# Patient Record
Sex: MALE | Race: WHITE | NOT HISPANIC OR LATINO | Employment: OTHER | URBAN - METROPOLITAN AREA
[De-identification: names, ages, dates, MRNs, and addresses within clinical notes are randomized per-mention and may not be internally consistent; named-entity substitution may affect disease eponyms.]

---

## 2024-08-16 ENCOUNTER — HOSPITAL ENCOUNTER (OUTPATIENT)
Facility: HOSPITAL | Age: 64
Discharge: HOME | End: 2024-08-18
Attending: EMERGENCY MEDICINE | Admitting: EMERGENCY MEDICINE
Payer: COMMERCIAL

## 2024-08-16 ENCOUNTER — APPOINTMENT (OUTPATIENT)
Dept: CT IMAGING | Facility: HOSPITAL | Age: 64
End: 2024-08-16
Payer: COMMERCIAL

## 2024-08-16 DIAGNOSIS — J38.3 VOCAL CORD DYSFUNCTION: ICD-10-CM

## 2024-08-16 DIAGNOSIS — K35.30 ACUTE APPENDICITIS WITH LOCALIZED PERITONITIS, WITHOUT PERFORATION, ABSCESS, OR GANGRENE: Primary | ICD-10-CM

## 2024-08-16 DIAGNOSIS — K37 APPENDICITIS: ICD-10-CM

## 2024-08-16 PROBLEM — K35.80 ACUTE APPENDICITIS: Status: ACTIVE | Noted: 2024-08-16

## 2024-08-16 LAB
ALBUMIN SERPL-MCNC: 4.1 G/DL (ref 3.5–5.2)
ALBUMIN/GLOB SERPL: 1.2 G/DL
ALP SERPL-CCNC: 82 U/L (ref 39–117)
ALT SERPL W P-5'-P-CCNC: 15 U/L (ref 1–41)
ANION GAP SERPL CALCULATED.3IONS-SCNC: 10 MMOL/L (ref 5–15)
AST SERPL-CCNC: 14 U/L (ref 1–40)
BACTERIA UR QL AUTO: ABNORMAL /HPF
BASOPHILS # BLD AUTO: 0.08 10*3/MM3 (ref 0–0.2)
BASOPHILS NFR BLD AUTO: 0.6 % (ref 0–1.5)
BILIRUB SERPL-MCNC: 0.5 MG/DL (ref 0–1.2)
BILIRUB UR QL STRIP: NEGATIVE
BUN SERPL-MCNC: 16 MG/DL (ref 8–23)
BUN/CREAT SERPL: 14.5 (ref 7–25)
CALCIUM SPEC-SCNC: 9.8 MG/DL (ref 8.6–10.5)
CHLORIDE SERPL-SCNC: 98 MMOL/L (ref 98–107)
CLARITY UR: CLEAR
CO2 SERPL-SCNC: 28 MMOL/L (ref 22–29)
COLOR UR: ABNORMAL
CREAT SERPL-MCNC: 1.1 MG/DL (ref 0.76–1.27)
D-LACTATE SERPL-SCNC: 1.1 MMOL/L (ref 0.5–2)
DEPRECATED RDW RBC AUTO: 43.9 FL (ref 37–54)
EGFRCR SERPLBLD CKD-EPI 2021: 75 ML/MIN/1.73
EOSINOPHIL # BLD AUTO: 0.2 10*3/MM3 (ref 0–0.4)
EOSINOPHIL NFR BLD AUTO: 1.5 % (ref 0.3–6.2)
ERYTHROCYTE [DISTWIDTH] IN BLOOD BY AUTOMATED COUNT: 12.6 % (ref 12.3–15.4)
GLOBULIN UR ELPH-MCNC: 3.4 GM/DL
GLUCOSE SERPL-MCNC: 127 MG/DL (ref 65–99)
GLUCOSE UR STRIP-MCNC: NEGATIVE MG/DL
HCT VFR BLD AUTO: 44.6 % (ref 37.5–51)
HGB BLD-MCNC: 15.4 G/DL (ref 13–17.7)
HGB UR QL STRIP.AUTO: NEGATIVE
HOLD SPECIMEN: NORMAL
HYALINE CASTS UR QL AUTO: ABNORMAL /LPF
IMM GRANULOCYTES # BLD AUTO: 0.05 10*3/MM3 (ref 0–0.05)
IMM GRANULOCYTES NFR BLD AUTO: 0.4 % (ref 0–0.5)
KETONES UR QL STRIP: ABNORMAL
LEUKOCYTE ESTERASE UR QL STRIP.AUTO: ABNORMAL
LIPASE SERPL-CCNC: 36 U/L (ref 13–60)
LYMPHOCYTES # BLD AUTO: 3.43 10*3/MM3 (ref 0.7–3.1)
LYMPHOCYTES NFR BLD AUTO: 25.7 % (ref 19.6–45.3)
MCH RBC QN AUTO: 33 PG (ref 26.6–33)
MCHC RBC AUTO-ENTMCNC: 34.5 G/DL (ref 31.5–35.7)
MCV RBC AUTO: 95.5 FL (ref 79–97)
MONOCYTES # BLD AUTO: 1.43 10*3/MM3 (ref 0.1–0.9)
MONOCYTES NFR BLD AUTO: 10.7 % (ref 5–12)
MUCOUS THREADS URNS QL MICRO: ABNORMAL /HPF
NEUTROPHILS NFR BLD AUTO: 61.1 % (ref 42.7–76)
NEUTROPHILS NFR BLD AUTO: 8.14 10*3/MM3 (ref 1.7–7)
NITRITE UR QL STRIP: NEGATIVE
NRBC BLD AUTO-RTO: 0 /100 WBC (ref 0–0.2)
PH UR STRIP.AUTO: 5.5 [PH] (ref 5–8)
PLATELET # BLD AUTO: 245 10*3/MM3 (ref 140–450)
PMV BLD AUTO: 9.4 FL (ref 6–12)
POTASSIUM SERPL-SCNC: 4 MMOL/L (ref 3.5–5.2)
PROT SERPL-MCNC: 7.5 G/DL (ref 6–8.5)
PROT UR QL STRIP: ABNORMAL
RBC # BLD AUTO: 4.67 10*6/MM3 (ref 4.14–5.8)
RBC # UR STRIP: ABNORMAL /HPF
REF LAB TEST METHOD: ABNORMAL
SODIUM SERPL-SCNC: 136 MMOL/L (ref 136–145)
SP GR UR STRIP: 1.03 (ref 1–1.03)
SQUAMOUS #/AREA URNS HPF: ABNORMAL /HPF
UROBILINOGEN UR QL STRIP: ABNORMAL
WBC # UR STRIP: ABNORMAL /HPF
WBC NRBC COR # BLD AUTO: 13.33 10*3/MM3 (ref 3.4–10.8)
WHOLE BLOOD HOLD COAG: NORMAL
WHOLE BLOOD HOLD SPECIMEN: NORMAL

## 2024-08-16 PROCEDURE — 25010000002 MORPHINE PER 10 MG: Performed by: EMERGENCY MEDICINE

## 2024-08-16 PROCEDURE — 81001 URINALYSIS AUTO W/SCOPE: CPT | Performed by: EMERGENCY MEDICINE

## 2024-08-16 PROCEDURE — 96365 THER/PROPH/DIAG IV INF INIT: CPT

## 2024-08-16 PROCEDURE — 85025 COMPLETE CBC W/AUTO DIFF WBC: CPT | Performed by: EMERGENCY MEDICINE

## 2024-08-16 PROCEDURE — 74177 CT ABD & PELVIS W/CONTRAST: CPT

## 2024-08-16 PROCEDURE — 36415 COLL VENOUS BLD VENIPUNCTURE: CPT

## 2024-08-16 PROCEDURE — 25010000002 PIPERACILLIN SOD-TAZOBACTAM PER 1 G: Performed by: PHYSICIAN ASSISTANT

## 2024-08-16 PROCEDURE — 96375 TX/PRO/DX INJ NEW DRUG ADDON: CPT

## 2024-08-16 PROCEDURE — 87040 BLOOD CULTURE FOR BACTERIA: CPT | Performed by: PHYSICIAN ASSISTANT

## 2024-08-16 PROCEDURE — 80053 COMPREHEN METABOLIC PANEL: CPT | Performed by: EMERGENCY MEDICINE

## 2024-08-16 PROCEDURE — 83690 ASSAY OF LIPASE: CPT | Performed by: EMERGENCY MEDICINE

## 2024-08-16 PROCEDURE — G0378 HOSPITAL OBSERVATION PER HR: HCPCS

## 2024-08-16 PROCEDURE — 25010000002 ONDANSETRON PER 1 MG: Performed by: EMERGENCY MEDICINE

## 2024-08-16 PROCEDURE — 25510000001 IOPAMIDOL 61 % SOLUTION: Performed by: EMERGENCY MEDICINE

## 2024-08-16 PROCEDURE — 99285 EMERGENCY DEPT VISIT HI MDM: CPT

## 2024-08-16 PROCEDURE — 83605 ASSAY OF LACTIC ACID: CPT | Performed by: EMERGENCY MEDICINE

## 2024-08-16 RX ORDER — SODIUM CHLORIDE 9 MG/ML
10 INJECTION, SOLUTION INTRAMUSCULAR; INTRAVENOUS; SUBCUTANEOUS AS NEEDED
Status: DISCONTINUED | OUTPATIENT
Start: 2024-08-16 | End: 2024-08-18 | Stop reason: HOSPADM

## 2024-08-16 RX ORDER — EZETIMIBE 10 MG/1
10 TABLET ORAL DAILY
COMMUNITY

## 2024-08-16 RX ORDER — SPIRONOLACTONE 25 MG/1
25 TABLET ORAL DAILY
COMMUNITY

## 2024-08-16 RX ORDER — MORPHINE SULFATE 4 MG/ML
4 INJECTION, SOLUTION INTRAMUSCULAR; INTRAVENOUS ONCE
Status: COMPLETED | OUTPATIENT
Start: 2024-08-16 | End: 2024-08-16

## 2024-08-16 RX ORDER — ONDANSETRON 2 MG/ML
4 INJECTION INTRAMUSCULAR; INTRAVENOUS ONCE
Status: COMPLETED | OUTPATIENT
Start: 2024-08-16 | End: 2024-08-16

## 2024-08-16 RX ADMIN — PIPERACILLIN AND TAZOBACTAM 3.38 G: 3; .375 INJECTION, POWDER, LYOPHILIZED, FOR SOLUTION INTRAVENOUS at 22:49

## 2024-08-16 RX ADMIN — MORPHINE SULFATE 4 MG: 4 INJECTION, SOLUTION INTRAMUSCULAR; INTRAVENOUS at 20:54

## 2024-08-16 RX ADMIN — ONDANSETRON 4 MG: 2 INJECTION INTRAMUSCULAR; INTRAVENOUS at 20:53

## 2024-08-16 RX ADMIN — IOPAMIDOL 85 ML: 612 INJECTION, SOLUTION INTRAVENOUS at 20:35

## 2024-08-16 NOTE — ED PROVIDER NOTES
Subjective  History of Present Illness:    Chief Complaint: Right lower quadrant abdominal pain  History of Present Illness: 64-year-old male with right lower quadrant abdominal pain, the pain began 3 days ago in his mid to lower abdomen, then migrated to the right side.  He said a decreased appetite.  The pain is sharp in nature, he was seen at urgent treatment center and sent to the emergency department today for further evaluation.  Significant past medical history for coronary disease, status post bypass, hypertension, hyperlipidemia.  The last time he ate was open of this afternoon at about noon.  No urinary symptoms, no fever no chills, no previous abdominal surgeries  Onset: Gradual  Duration: 2 to 3 days  Exacerbating / Alleviating factors: Pain is worse with movement and to touch  Associated symptoms: Constipation earlier in the week      Nurses Notes reviewed and agree, including vitals, allergies, social history and prior medical history.     REVIEW OF SYSTEMS: All systems reviewed and not pertinent unless noted.    Review of Systems   Constitutional:  Positive for appetite change.   Gastrointestinal:  Positive for abdominal pain and nausea.   All other systems reviewed and are negative.      Past Medical History:   Diagnosis Date    Heart disease     History of quadruple bypass     Hyperlipidemia     Hypertension     Myocardial infarction     Ulcerative colitis        Allergies:    Patient has no known allergies.      Past Surgical History:   Procedure Laterality Date    CORONARY ARTERY BYPASS GRAFT      LUNG BIOPSY           Social History     Socioeconomic History    Marital status:    Tobacco Use    Smoking status: Every Day     Types: Cigars    Tobacco comments:     1 cigar QD   Substance and Sexual Activity    Alcohol use: Yes     Comment: Moderate    Drug use: No    Sexual activity: Defer         Family History   Problem Relation Age of Onset    Heart disease Mother     Heart disease Father   "   Cancer Sister        Objective  Physical Exam:  /66 (BP Location: Right arm, Patient Position: Sitting)   Pulse 58   Temp 98.4 °F (36.9 °C) (Oral)   Resp 18   Ht 172.7 cm (68\")   Wt 80.7 kg (178 lb)   SpO2 94%   BMI 27.06 kg/m²      Physical Exam  Vitals and nursing note reviewed.   Constitutional:       Appearance: He is well-developed.   HENT:      Head: Normocephalic and atraumatic.      Mouth/Throat:      Mouth: Mucous membranes are moist.   Eyes:      Extraocular Movements: Extraocular movements intact.   Cardiovascular:      Rate and Rhythm: Normal rate and regular rhythm.   Pulmonary:      Effort: Pulmonary effort is normal.      Breath sounds: Normal breath sounds.   Abdominal:      Palpations: Abdomen is soft.      Tenderness: There is abdominal tenderness in the right lower quadrant. There is guarding.   Musculoskeletal:         General: Normal range of motion.      Cervical back: Normal range of motion.   Skin:     General: Skin is warm and dry.   Neurological:      Mental Status: He is alert and oriented to person, place, and time.   Psychiatric:         Behavior: Behavior normal.         Thought Content: Thought content normal.         Judgment: Judgment normal.           Procedures    ED Course:    CT abdomen/pelvis interpretation by me: Inflamed appendix with surrounding inflammation and stranding consistent with acute appendicitis    ED Course as of 08/16/24 2310   Fri Aug 16, 2024   1946 Review of previous  non ED visits, prior labs, prior imaging, available notes from prior evaluations or visits with specialists, medication list, allergies, past medical history, past surgical history     [CS]   2030 I Personally reviewed all laboratory studies performed in the emergency department  [CS]   2145 Call placed to the exchange for Dr. Mott  general surgery [CS]   2149 Discussed the case with Dr. Mott, general surgery, he recommended admitting to the hospitalist, given IV Zosyn, plan " for operation in the morning [CS]   2159 Updated the patient on the plan of admission, antibiotics, and plan for surgery in the morning answered all questions and concerns. [CS]   2200 Message sent to Dr. Ross for admission [CS]      ED Course User Index  [CS] Parveen Diaz Jr., KAYLEIGH       Lab Results (last 24 hours)       Procedure Component Value Units Date/Time    CBC & Differential [[id number]]  (Abnormal) Collected: 08/16/24 2006    Specimen: Blood Updated: 08/16/24 2016    Narrative:      The following orders were created for panel order CBC & Differential.  Procedure                               Abnormality         Status                     ---------                               -----------         ------                     CBC Auto Differential[[id number]]        Abnormal            Final result                 Please view results for these tests on the individual orders.    Comprehensive Metabolic Panel [[id number]]  (Abnormal) Collected: 08/16/24 2006    Specimen: Blood Updated: 08/16/24 2035     Glucose 127 mg/dL      BUN 16 mg/dL      Creatinine 1.10 mg/dL      Sodium 136 mmol/L      Potassium 4.0 mmol/L      Chloride 98 mmol/L      CO2 28.0 mmol/L      Calcium 9.8 mg/dL      Total Protein 7.5 g/dL      Albumin 4.1 g/dL      ALT (SGPT) 15 U/L      AST (SGOT) 14 U/L      Alkaline Phosphatase 82 U/L      Total Bilirubin 0.5 mg/dL      Globulin 3.4 gm/dL      Comment: Calculated Result        A/G Ratio 1.2 g/dL      BUN/Creatinine Ratio 14.5     Anion Gap 10.0 mmol/L      eGFR 75.0 mL/min/1.73     Narrative:      GFR Normal >60  Chronic Kidney Disease <60  Kidney Failure <15      Lipase [[id number]]  (Normal) Collected: 08/16/24 2006    Specimen: Blood Updated: 08/16/24 2035     Lipase 36 U/L     Lactic Acid, Plasma [[id number]]  (Normal) Collected: 08/16/24 2006    Specimen: Blood Updated: 08/16/24 2032     Lactate 1.1 mmol/L      Comment: Falsely depressed results may occur on samples drawn from  patients receiving N-Acetylcysteine (NAC) or Metamizole.       CBC Auto Differential [[id number]]  (Abnormal) Collected: 08/16/24 2006    Specimen: Blood Updated: 08/16/24 2016     WBC 13.33 10*3/mm3      RBC 4.67 10*6/mm3      Hemoglobin 15.4 g/dL      Hematocrit 44.6 %      MCV 95.5 fL      MCH 33.0 pg      MCHC 34.5 g/dL      RDW 12.6 %      RDW-SD 43.9 fl      MPV 9.4 fL      Platelets 245 10*3/mm3      Neutrophil % 61.1 %      Lymphocyte % 25.7 %      Monocyte % 10.7 %      Eosinophil % 1.5 %      Basophil % 0.6 %      Immature Grans % 0.4 %      Neutrophils, Absolute 8.14 10*3/mm3      Lymphocytes, Absolute 3.43 10*3/mm3      Monocytes, Absolute 1.43 10*3/mm3      Eosinophils, Absolute 0.20 10*3/mm3      Basophils, Absolute 0.08 10*3/mm3      Immature Grans, Absolute 0.05 10*3/mm3      nRBC 0.0 /100 WBC     Urinalysis With Microscopic If Indicated (No Culture) - Urine, Clean Catch [[id number]]  (Abnormal) Collected: 08/16/24 2008    Specimen: Urine, Clean Catch Updated: 08/16/24 2024     Color, UA Dark Yellow     Appearance, UA Clear     pH, UA 5.5     Specific Gravity, UA 1.026     Glucose, UA Negative     Ketones, UA Trace     Bilirubin, UA Negative     Blood, UA Negative     Protein, UA Trace     Leuk Esterase, UA Trace     Nitrite, UA Negative     Urobilinogen, UA 1.0 E.U./dL    Urinalysis, Microscopic Only - Urine, Clean Catch [[id number]]  (Abnormal) Collected: 08/16/24 2008    Specimen: Urine, Clean Catch Updated: 08/16/24 2032     RBC, UA 3-5 /HPF      WBC, UA 0-2 /HPF      Bacteria, UA None Seen /HPF      Squamous Epithelial Cells, UA 0-2 /HPF      Hyaline Casts, UA 0-6 /LPF      Mucus, UA Moderate/2+ /HPF      Methodology Manual Light Microscopy             CT Abdomen Pelvis With Contrast    Result Date: 8/16/2024  CT ABDOMEN PELVIS W CONTRAST Date of Exam: 8/16/2024 8:30 PM EDT Indication: rlq pain  r/o appendicitis. Comparison: None available. Technique: Axial CT images were obtained of the abdomen  and pelvis following the uneventful intravenous administration of iodinated contrast. Reconstructed coronal and sagittal images were also obtained. Automated exposure control and iterative construction methods were used. Findings: The lung bases are clear bilaterally. A gallstone is noted in the gallbladder without CT evidence of cholecystitis. The liver, spleen, pancreas and adrenal glands appear unremarkable. Both kidneys appear normal. Moderate atherosclerotic changes are noted in the abdominal aorta and its major branches. No adenopathy or free fluid is seen in the abdomen or pelvis. The urinary bladder, seminal vesicles and prostate appear within normal limits for age. The appendix is thickened measuring 1.2 cm in diameter. Surrounding infiltrative changes are noted consistent with acute appendicitis. Secondary inflammatory changes of the terminal ileum are suspected. No free air or abscess is identified. The bowel otherwise appears unremarkable. There is a 0.2 cm anterolisthesis of L4 on L5. No focal osseous lesion is seen.     Impression: Impression: 1.Acute appendicitis without evidence of perforation or abscess. 2.Suspected secondary inflammation of the adjacent terminal ileum. 3.Cholelithiasis. Electronically Signed: Shine Lua MD  8/16/2024 9:35 PM EDT  Workstation ID: [id number]        Medical Decision Making  Patient Presentation 64-year-old male presented with right-sided abdominal pain, on minus assessment he had tenderness to palpation in his right lower quadrant with guarding    DDX. Differential diagnosis would include early appendicitis, acute appendicitis, colitis, diverticulitis, irritable bowel disease, irritable syndrome, nephrolithiasis, pyelonephritis.       Data Review/ Non ED Records /Analysis/Ordering unique tests  Review of previous  non ED visits, prior labs, prior imaging, available notes from prior evaluations or visits with specialists, medication list, allergies, past medical  history, past surgical history        Independent Review Studies  I Personally reviewed all laboratory studies performed in the emergency department     Intervention/Re-evaluation intervention included IV fluids, antiemetics and pain medicine on reevaluation his symptoms were improved    Independent Clinician discussed with the on-call general surgeon Dr. Mott, discussed with the on-call hospitalist Dr. Ross who accepted for admission    Risk Stratification tools/clinical decision rules patient presented with right lower quadrant abdominal pain, considered appendicitis, colitis, enteritis, urinary tract infection, kidney stones, on my initial assessment he had tenderness to palpation in his right lower quadrant with guarding, labs show an elevation in his white count with a shift, CT scan was consistent with acute appendicitis    Shared Decision Making discussed results with patient and plan for surgery    Disposition patient admitted for appendectomy    Problems Addressed:  Acute appendicitis with localized peritonitis, without perforation, abscess, or gangrene: complicated acute illness or injury    Amount and/or Complexity of Data Reviewed  External Data Reviewed: labs and notes.  Labs: ordered. Decision-making details documented in ED Course.  Radiology: ordered and independent interpretation performed. Decision-making details documented in ED Course.    Risk  Prescription drug management.  Decision regarding hospitalization.          Final diagnoses:   Acute appendicitis with localized peritonitis, without perforation, abscess, or gangrene           Disposition admitted       Parveen Diaz Jr., KAYLEIGH  08/16/24 5538

## 2024-08-17 ENCOUNTER — ANESTHESIA EVENT (OUTPATIENT)
Dept: PERIOP | Facility: HOSPITAL | Age: 64
End: 2024-08-17
Payer: COMMERCIAL

## 2024-08-17 ENCOUNTER — ANESTHESIA (OUTPATIENT)
Dept: PERIOP | Facility: HOSPITAL | Age: 64
End: 2024-08-17
Payer: COMMERCIAL

## 2024-08-17 PROBLEM — J38.3 VOCAL CORD DYSFUNCTION: Status: ACTIVE | Noted: 2024-08-17

## 2024-08-17 LAB
ALBUMIN SERPL-MCNC: 3.8 G/DL (ref 3.5–5.2)
ALBUMIN/GLOB SERPL: 1.9 G/DL
ALP SERPL-CCNC: 65 U/L (ref 39–117)
ALT SERPL W P-5'-P-CCNC: 11 U/L (ref 1–41)
ANION GAP SERPL CALCULATED.3IONS-SCNC: 9 MMOL/L (ref 5–15)
AST SERPL-CCNC: 13 U/L (ref 1–40)
BASOPHILS # BLD AUTO: 0.07 10*3/MM3 (ref 0–0.2)
BASOPHILS NFR BLD AUTO: 0.7 % (ref 0–1.5)
BILIRUB SERPL-MCNC: 0.2 MG/DL (ref 0–1.2)
BUN SERPL-MCNC: 15 MG/DL (ref 8–23)
BUN/CREAT SERPL: 12.9 (ref 7–25)
CALCIUM SPEC-SCNC: 8.8 MG/DL (ref 8.6–10.5)
CHLORIDE SERPL-SCNC: 103 MMOL/L (ref 98–107)
CO2 SERPL-SCNC: 27 MMOL/L (ref 22–29)
CREAT SERPL-MCNC: 1.16 MG/DL (ref 0.76–1.27)
DEPRECATED RDW RBC AUTO: 45.7 FL (ref 37–54)
EGFRCR SERPLBLD CKD-EPI 2021: 70.3 ML/MIN/1.73
EOSINOPHIL # BLD AUTO: 0.38 10*3/MM3 (ref 0–0.4)
EOSINOPHIL NFR BLD AUTO: 3.7 % (ref 0.3–6.2)
ERYTHROCYTE [DISTWIDTH] IN BLOOD BY AUTOMATED COUNT: 12.7 % (ref 12.3–15.4)
GLOBULIN UR ELPH-MCNC: 2 GM/DL
GLUCOSE SERPL-MCNC: 91 MG/DL (ref 65–99)
HCT VFR BLD AUTO: 40.5 % (ref 37.5–51)
HGB BLD-MCNC: 13.7 G/DL (ref 13–17.7)
IMM GRANULOCYTES # BLD AUTO: 0.03 10*3/MM3 (ref 0–0.05)
IMM GRANULOCYTES NFR BLD AUTO: 0.3 % (ref 0–0.5)
INR PPP: 0.98 (ref 0.89–1.12)
LYMPHOCYTES # BLD AUTO: 3.12 10*3/MM3 (ref 0.7–3.1)
LYMPHOCYTES NFR BLD AUTO: 30.2 % (ref 19.6–45.3)
MAGNESIUM SERPL-MCNC: 2.2 MG/DL (ref 1.6–2.4)
MCH RBC QN AUTO: 32.9 PG (ref 26.6–33)
MCHC RBC AUTO-ENTMCNC: 33.8 G/DL (ref 31.5–35.7)
MCV RBC AUTO: 97.1 FL (ref 79–97)
MONOCYTES # BLD AUTO: 1.26 10*3/MM3 (ref 0.1–0.9)
MONOCYTES NFR BLD AUTO: 12.2 % (ref 5–12)
NEUTROPHILS NFR BLD AUTO: 5.48 10*3/MM3 (ref 1.7–7)
NEUTROPHILS NFR BLD AUTO: 52.9 % (ref 42.7–76)
NRBC BLD AUTO-RTO: 0 /100 WBC (ref 0–0.2)
PLATELET # BLD AUTO: 238 10*3/MM3 (ref 140–450)
PMV BLD AUTO: 9.6 FL (ref 6–12)
POTASSIUM SERPL-SCNC: 4.4 MMOL/L (ref 3.5–5.2)
PROT SERPL-MCNC: 5.8 G/DL (ref 6–8.5)
PROTHROMBIN TIME: 13.1 SECONDS (ref 12.2–14.5)
RBC # BLD AUTO: 4.17 10*6/MM3 (ref 4.14–5.8)
SODIUM SERPL-SCNC: 139 MMOL/L (ref 136–145)
WBC NRBC COR # BLD AUTO: 10.34 10*3/MM3 (ref 3.4–10.8)

## 2024-08-17 PROCEDURE — 25010000002 FENTANYL CITRATE (PF) 50 MCG/ML SOLUTION

## 2024-08-17 PROCEDURE — 25010000002 CEFTRIAXONE PER 250 MG: Performed by: INTERNAL MEDICINE

## 2024-08-17 PROCEDURE — 99204 OFFICE O/P NEW MOD 45 MIN: CPT | Performed by: INTERNAL MEDICINE

## 2024-08-17 PROCEDURE — 96376 TX/PRO/DX INJ SAME DRUG ADON: CPT

## 2024-08-17 PROCEDURE — 96361 HYDRATE IV INFUSION ADD-ON: CPT

## 2024-08-17 PROCEDURE — 94761 N-INVAS EAR/PLS OXIMETRY MLT: CPT

## 2024-08-17 PROCEDURE — 25010000002 CEFTRIAXONE PER 250 MG: Performed by: SURGERY

## 2024-08-17 PROCEDURE — 94799 UNLISTED PULMONARY SVC/PX: CPT

## 2024-08-17 PROCEDURE — 25810000003 SODIUM CHLORIDE 0.9 % SOLUTION: Performed by: INTERNAL MEDICINE

## 2024-08-17 PROCEDURE — 25010000002 METRONIDAZOLE 500 MG/100ML SOLUTION: Performed by: INTERNAL MEDICINE

## 2024-08-17 PROCEDURE — 83735 ASSAY OF MAGNESIUM: CPT | Performed by: INTERNAL MEDICINE

## 2024-08-17 PROCEDURE — 25010000002 HYDROMORPHONE PER 4 MG: Performed by: SURGERY

## 2024-08-17 PROCEDURE — 25010000002 MORPHINE PER 10 MG: Performed by: INTERNAL MEDICINE

## 2024-08-17 PROCEDURE — 25010000002 PROPOFOL 10 MG/ML EMULSION

## 2024-08-17 PROCEDURE — 96367 TX/PROPH/DG ADDL SEQ IV INF: CPT

## 2024-08-17 PROCEDURE — 25010000002 SUGAMMADEX 200 MG/2ML SOLUTION

## 2024-08-17 PROCEDURE — 80053 COMPREHEN METABOLIC PANEL: CPT | Performed by: INTERNAL MEDICINE

## 2024-08-17 PROCEDURE — 25010000002 DEXAMETHASONE PER 1 MG

## 2024-08-17 PROCEDURE — 25010000002 METRONIDAZOLE 500 MG/100ML SOLUTION: Performed by: SURGERY

## 2024-08-17 PROCEDURE — 25010000002 FENTANYL CITRATE (PF) 100 MCG/2ML SOLUTION

## 2024-08-17 PROCEDURE — 88304 TISSUE EXAM BY PATHOLOGIST: CPT | Performed by: SURGERY

## 2024-08-17 PROCEDURE — G0378 HOSPITAL OBSERVATION PER HR: HCPCS

## 2024-08-17 PROCEDURE — 25010000002 ONDANSETRON PER 1 MG

## 2024-08-17 PROCEDURE — 31622 DX BRONCHOSCOPE/WASH: CPT | Performed by: INTERNAL MEDICINE

## 2024-08-17 PROCEDURE — 94640 AIRWAY INHALATION TREATMENT: CPT

## 2024-08-17 PROCEDURE — 85025 COMPLETE CBC W/AUTO DIFF WBC: CPT | Performed by: INTERNAL MEDICINE

## 2024-08-17 PROCEDURE — 85610 PROTHROMBIN TIME: CPT | Performed by: INTERNAL MEDICINE

## 2024-08-17 PROCEDURE — 25810000003 LACTATED RINGERS PER 1000 ML

## 2024-08-17 PROCEDURE — 25010000002 MORPHINE PER 10 MG: Performed by: SURGERY

## 2024-08-17 PROCEDURE — 25810000003 SODIUM CHLORIDE PER 500 ML: Performed by: SURGERY

## 2024-08-17 DEVICE — LIGAMAX 5 MM ENDOSCOPIC MULTIPLE CLIP APPLIER
Type: IMPLANTABLE DEVICE | Site: ABDOMEN | Status: FUNCTIONAL
Brand: LIGAMAX

## 2024-08-17 DEVICE — THE ECHELON, ECHELON ENDOPATH™ AND ECHELON FLEX™ FAMILIES OF ENDOSCOPIC LINEAR CUTTERS AND RELOADS ARE STERILE, SINGLE PATIENT USE INSTRUMENTS THAT SIMULTANEOUSLY CUT AND STAPLE TISSUE. THERE ARE SIX STAGGERED ROWS OF STAPLES, THREE ON EITHER SIDE OF THE CUT LINE. THE 45 MM INSTRUMENTS HAVE A STAPLE LINE THATIS APPROXIMATELY 45 MM LONG AND A CUT LINE THAT IS APPROXIMATELY 42 MM LONG. THE SHAFT CAN ROTATE FREELY IN BOTH DIRECTIONS AND AN ARTICULATION MECHANISM ON ARTICULATING INSTRUMENTS ENABLES BENDING THE DISTAL PORTIONOF THE SHAFT TO FACILITATE LATERAL ACCESS OF THE OPERATIVE SITE.THE INSTRUMENTS ARE SHIPPED WITHOUT A RELOAD AND MUST BE LOADED PRIOR TO USE. A STAPLE RETAINING CAP ON THE RELOAD PROTECTS THE STAPLE LEG POINTS DURING SHIPPING AND TRANSPORTATION. THE INSTRUMENTS’ LOCK-OUT FEATURE IS DESIGNED TO PREVENT A USED RELOAD FROM BEING REFIRED.
Type: IMPLANTABLE DEVICE | Site: ABDOMEN | Status: FUNCTIONAL
Brand: ECHELON ENDOPATH

## 2024-08-17 RX ORDER — PROMETHAZINE HYDROCHLORIDE 25 MG/1
25 TABLET ORAL ONCE AS NEEDED
Status: DISCONTINUED | OUTPATIENT
Start: 2024-08-17 | End: 2024-08-17 | Stop reason: HOSPADM

## 2024-08-17 RX ORDER — ACETAMINOPHEN 325 MG/1
650 TABLET ORAL EVERY 4 HOURS PRN
Status: DISCONTINUED | OUTPATIENT
Start: 2024-08-17 | End: 2024-08-18 | Stop reason: HOSPADM

## 2024-08-17 RX ORDER — OXYCODONE HYDROCHLORIDE AND ACETAMINOPHEN 5; 325 MG/1; MG/1
1 TABLET ORAL EVERY 4 HOURS PRN
Status: DISCONTINUED | OUTPATIENT
Start: 2024-08-17 | End: 2024-08-18 | Stop reason: HOSPADM

## 2024-08-17 RX ORDER — HYDRALAZINE HYDROCHLORIDE 20 MG/ML
5 INJECTION INTRAMUSCULAR; INTRAVENOUS
Status: DISCONTINUED | OUTPATIENT
Start: 2024-08-17 | End: 2024-08-17 | Stop reason: HOSPADM

## 2024-08-17 RX ORDER — FAMOTIDINE 20 MG/1
20 TABLET, FILM COATED ORAL ONCE
Status: CANCELLED | OUTPATIENT
Start: 2024-08-17 | End: 2024-08-17

## 2024-08-17 RX ORDER — SODIUM CHLORIDE 0.9 % (FLUSH) 0.9 %
10 SYRINGE (ML) INJECTION EVERY 12 HOURS SCHEDULED
Status: CANCELLED | OUTPATIENT
Start: 2024-08-17

## 2024-08-17 RX ORDER — DEXAMETHASONE SODIUM PHOSPHATE 4 MG/ML
INJECTION, SOLUTION INTRA-ARTICULAR; INTRALESIONAL; INTRAMUSCULAR; INTRAVENOUS; SOFT TISSUE
Status: COMPLETED
Start: 2024-08-17 | End: 2024-08-17

## 2024-08-17 RX ORDER — SODIUM CHLORIDE 9 MG/ML
40 INJECTION, SOLUTION INTRAVENOUS AS NEEDED
Status: CANCELLED | OUTPATIENT
Start: 2024-08-17

## 2024-08-17 RX ORDER — IPRATROPIUM BROMIDE AND ALBUTEROL SULFATE 2.5; .5 MG/3ML; MG/3ML
3 SOLUTION RESPIRATORY (INHALATION)
Status: DISCONTINUED | OUTPATIENT
Start: 2024-08-17 | End: 2024-08-17

## 2024-08-17 RX ORDER — DEXAMETHASONE SODIUM PHOSPHATE 4 MG/ML
4 INJECTION, SOLUTION INTRA-ARTICULAR; INTRALESIONAL; INTRAMUSCULAR; INTRAVENOUS; SOFT TISSUE ONCE
Status: COMPLETED | OUTPATIENT
Start: 2024-08-17 | End: 2024-08-17

## 2024-08-17 RX ORDER — ACETAMINOPHEN 160 MG/5ML
650 SOLUTION ORAL EVERY 4 HOURS PRN
Status: DISCONTINUED | OUTPATIENT
Start: 2024-08-17 | End: 2024-08-17 | Stop reason: SDUPTHER

## 2024-08-17 RX ORDER — SODIUM CHLORIDE, SODIUM LACTATE, POTASSIUM CHLORIDE, CALCIUM CHLORIDE 600; 310; 30; 20 MG/100ML; MG/100ML; MG/100ML; MG/100ML
INJECTION, SOLUTION INTRAVENOUS CONTINUOUS PRN
Status: DISCONTINUED | OUTPATIENT
Start: 2024-08-17 | End: 2024-08-17 | Stop reason: SURG

## 2024-08-17 RX ORDER — NALOXONE HCL 0.4 MG/ML
0.4 VIAL (ML) INJECTION
Status: DISCONTINUED | OUTPATIENT
Start: 2024-08-17 | End: 2024-08-18 | Stop reason: HOSPADM

## 2024-08-17 RX ORDER — FENTANYL CITRATE 50 UG/ML
INJECTION, SOLUTION INTRAMUSCULAR; INTRAVENOUS AS NEEDED
Status: DISCONTINUED | OUTPATIENT
Start: 2024-08-17 | End: 2024-08-17 | Stop reason: SURG

## 2024-08-17 RX ORDER — SODIUM CHLORIDE 0.9 % (FLUSH) 0.9 %
10 SYRINGE (ML) INJECTION AS NEEDED
Status: DISCONTINUED | OUTPATIENT
Start: 2024-08-17 | End: 2024-08-18 | Stop reason: HOSPADM

## 2024-08-17 RX ORDER — FAMOTIDINE 10 MG/ML
20 INJECTION, SOLUTION INTRAVENOUS ONCE
Status: CANCELLED | OUTPATIENT
Start: 2024-08-17 | End: 2024-08-17

## 2024-08-17 RX ORDER — SUCCINYLCHOLINE/SOD CL,ISO/PF 200MG/10ML
SYRINGE (ML) INTRAVENOUS AS NEEDED
Status: DISCONTINUED | OUTPATIENT
Start: 2024-08-17 | End: 2024-08-17 | Stop reason: SURG

## 2024-08-17 RX ORDER — SODIUM CHLORIDE 9 MG/ML
40 INJECTION, SOLUTION INTRAVENOUS AS NEEDED
Status: DISCONTINUED | OUTPATIENT
Start: 2024-08-17 | End: 2024-08-17 | Stop reason: HOSPADM

## 2024-08-17 RX ORDER — ACETAMINOPHEN 650 MG/1
650 SUPPOSITORY RECTAL EVERY 4 HOURS PRN
Status: DISCONTINUED | OUTPATIENT
Start: 2024-08-17 | End: 2024-08-17 | Stop reason: SDUPTHER

## 2024-08-17 RX ORDER — BUDESONIDE 0.5 MG/2ML
0.5 INHALANT ORAL ONCE
Status: COMPLETED | OUTPATIENT
Start: 2024-08-17 | End: 2024-08-17

## 2024-08-17 RX ORDER — SODIUM CHLORIDE 9 MG/ML
100 INJECTION, SOLUTION INTRAVENOUS CONTINUOUS
Status: DISCONTINUED | OUTPATIENT
Start: 2024-08-17 | End: 2024-08-17

## 2024-08-17 RX ORDER — HYDROCODONE BITARTRATE AND ACETAMINOPHEN 5; 325 MG/1; MG/1
1 TABLET ORAL ONCE AS NEEDED
Status: DISCONTINUED | OUTPATIENT
Start: 2024-08-17 | End: 2024-08-17 | Stop reason: HOSPADM

## 2024-08-17 RX ORDER — MEPERIDINE HYDROCHLORIDE 25 MG/ML
12.5 INJECTION INTRAMUSCULAR; INTRAVENOUS; SUBCUTANEOUS
Status: DISCONTINUED | OUTPATIENT
Start: 2024-08-17 | End: 2024-08-17 | Stop reason: HOSPADM

## 2024-08-17 RX ORDER — ROSUVASTATIN CALCIUM 20 MG/1
40 TABLET, COATED ORAL DAILY
Status: DISCONTINUED | OUTPATIENT
Start: 2024-08-17 | End: 2024-08-18 | Stop reason: HOSPADM

## 2024-08-17 RX ORDER — IPRATROPIUM BROMIDE AND ALBUTEROL SULFATE 2.5; .5 MG/3ML; MG/3ML
3 SOLUTION RESPIRATORY (INHALATION) EVERY 6 HOURS PRN
Status: DISCONTINUED | OUTPATIENT
Start: 2024-08-17 | End: 2024-08-18 | Stop reason: HOSPADM

## 2024-08-17 RX ORDER — NALOXONE HCL 0.4 MG/ML
0.4 VIAL (ML) INJECTION AS NEEDED
Status: DISCONTINUED | OUTPATIENT
Start: 2024-08-17 | End: 2024-08-17 | Stop reason: HOSPADM

## 2024-08-17 RX ORDER — SODIUM CHLORIDE, SODIUM LACTATE, POTASSIUM CHLORIDE, CALCIUM CHLORIDE 600; 310; 30; 20 MG/100ML; MG/100ML; MG/100ML; MG/100ML
9 INJECTION, SOLUTION INTRAVENOUS CONTINUOUS
Status: CANCELLED | OUTPATIENT
Start: 2024-08-17

## 2024-08-17 RX ORDER — ONDANSETRON 2 MG/ML
4 INJECTION INTRAMUSCULAR; INTRAVENOUS ONCE AS NEEDED
Status: DISCONTINUED | OUTPATIENT
Start: 2024-08-17 | End: 2024-08-17 | Stop reason: HOSPADM

## 2024-08-17 RX ORDER — SODIUM CHLORIDE 9 MG/ML
40 INJECTION, SOLUTION INTRAVENOUS AS NEEDED
Status: DISCONTINUED | OUTPATIENT
Start: 2024-08-17 | End: 2024-08-18 | Stop reason: HOSPADM

## 2024-08-17 RX ORDER — SODIUM CHLORIDE 0.9 % (FLUSH) 0.9 %
3-10 SYRINGE (ML) INJECTION AS NEEDED
Status: DISCONTINUED | OUTPATIENT
Start: 2024-08-17 | End: 2024-08-17 | Stop reason: HOSPADM

## 2024-08-17 RX ORDER — ACETAMINOPHEN 325 MG/1
650 TABLET ORAL EVERY 4 HOURS PRN
Status: DISCONTINUED | OUTPATIENT
Start: 2024-08-17 | End: 2024-08-17 | Stop reason: SDUPTHER

## 2024-08-17 RX ORDER — MIDAZOLAM HYDROCHLORIDE 1 MG/ML
1 INJECTION INTRAMUSCULAR; INTRAVENOUS
Status: CANCELLED | OUTPATIENT
Start: 2024-08-17

## 2024-08-17 RX ORDER — SODIUM CHLORIDE 0.9 % (FLUSH) 0.9 %
10 SYRINGE (ML) INJECTION EVERY 12 HOURS SCHEDULED
Status: DISCONTINUED | OUTPATIENT
Start: 2024-08-17 | End: 2024-08-18 | Stop reason: HOSPADM

## 2024-08-17 RX ORDER — IPRATROPIUM BROMIDE AND ALBUTEROL SULFATE 2.5; .5 MG/3ML; MG/3ML
3 SOLUTION RESPIRATORY (INHALATION) ONCE AS NEEDED
Status: DISCONTINUED | OUTPATIENT
Start: 2024-08-17 | End: 2024-08-17 | Stop reason: HOSPADM

## 2024-08-17 RX ORDER — ONDANSETRON 2 MG/ML
4 INJECTION INTRAMUSCULAR; INTRAVENOUS EVERY 6 HOURS PRN
Status: DISCONTINUED | OUTPATIENT
Start: 2024-08-17 | End: 2024-08-18 | Stop reason: HOSPADM

## 2024-08-17 RX ORDER — ACETAMINOPHEN 650 MG/1
650 SUPPOSITORY RECTAL EVERY 4 HOURS PRN
Status: DISCONTINUED | OUTPATIENT
Start: 2024-08-17 | End: 2024-08-18 | Stop reason: HOSPADM

## 2024-08-17 RX ORDER — EPHEDRINE SULFATE 50 MG/ML
INJECTION INTRAVENOUS AS NEEDED
Status: DISCONTINUED | OUTPATIENT
Start: 2024-08-17 | End: 2024-08-17 | Stop reason: SURG

## 2024-08-17 RX ORDER — LIDOCAINE HYDROCHLORIDE 10 MG/ML
0.5 INJECTION, SOLUTION EPIDURAL; INFILTRATION; INTRACAUDAL; PERINEURAL ONCE AS NEEDED
Status: CANCELLED | OUTPATIENT
Start: 2024-08-17

## 2024-08-17 RX ORDER — LIDOCAINE HYDROCHLORIDE 40 MG/ML
4 INJECTION, SOLUTION RETROBULBAR; TOPICAL ONCE
Status: COMPLETED | OUTPATIENT
Start: 2024-08-17 | End: 2024-08-17

## 2024-08-17 RX ORDER — BUPIVACAINE HYDROCHLORIDE AND EPINEPHRINE 2.5; 5 MG/ML; UG/ML
INJECTION, SOLUTION EPIDURAL; INFILTRATION; INTRACAUDAL; PERINEURAL AS NEEDED
Status: DISCONTINUED | OUTPATIENT
Start: 2024-08-17 | End: 2024-08-17 | Stop reason: HOSPADM

## 2024-08-17 RX ORDER — PROMETHAZINE HYDROCHLORIDE 25 MG/1
25 SUPPOSITORY RECTAL ONCE AS NEEDED
Status: DISCONTINUED | OUTPATIENT
Start: 2024-08-17 | End: 2024-08-17 | Stop reason: HOSPADM

## 2024-08-17 RX ORDER — ALBUTEROL SULFATE 2.5 MG/3ML
SOLUTION RESPIRATORY (INHALATION)
Status: DISPENSED
Start: 2024-08-17 | End: 2024-08-17

## 2024-08-17 RX ORDER — LIDOCAINE HYDROCHLORIDE 10 MG/ML
INJECTION, SOLUTION EPIDURAL; INFILTRATION; INTRACAUDAL; PERINEURAL AS NEEDED
Status: DISCONTINUED | OUTPATIENT
Start: 2024-08-17 | End: 2024-08-17 | Stop reason: SURG

## 2024-08-17 RX ORDER — MORPHINE SULFATE 2 MG/ML
1 INJECTION, SOLUTION INTRAMUSCULAR; INTRAVENOUS EVERY 4 HOURS PRN
Status: DISCONTINUED | OUTPATIENT
Start: 2024-08-17 | End: 2024-08-18 | Stop reason: HOSPADM

## 2024-08-17 RX ORDER — FENTANYL CITRATE 50 UG/ML
INJECTION, SOLUTION INTRAMUSCULAR; INTRAVENOUS
Status: COMPLETED
Start: 2024-08-17 | End: 2024-08-17

## 2024-08-17 RX ORDER — LISINOPRIL 20 MG/1
40 TABLET ORAL DAILY
Status: DISCONTINUED | OUTPATIENT
Start: 2024-08-17 | End: 2024-08-17

## 2024-08-17 RX ORDER — HYDROMORPHONE HYDROCHLORIDE 1 MG/ML
0.5 INJECTION, SOLUTION INTRAMUSCULAR; INTRAVENOUS; SUBCUTANEOUS
Status: DISCONTINUED | OUTPATIENT
Start: 2024-08-17 | End: 2024-08-17 | Stop reason: HOSPADM

## 2024-08-17 RX ORDER — LABETALOL HYDROCHLORIDE 5 MG/ML
5 INJECTION, SOLUTION INTRAVENOUS
Status: DISCONTINUED | OUTPATIENT
Start: 2024-08-17 | End: 2024-08-17 | Stop reason: HOSPADM

## 2024-08-17 RX ORDER — ASPIRIN 81 MG/1
81 TABLET ORAL DAILY
Status: DISCONTINUED | OUTPATIENT
Start: 2024-08-18 | End: 2024-08-18 | Stop reason: HOSPADM

## 2024-08-17 RX ORDER — DOCUSATE SODIUM 100 MG/1
100 CAPSULE, LIQUID FILLED ORAL 2 TIMES DAILY PRN
Status: DISCONTINUED | OUTPATIENT
Start: 2024-08-17 | End: 2024-08-18 | Stop reason: HOSPADM

## 2024-08-17 RX ORDER — FENTANYL CITRATE 50 UG/ML
50 INJECTION, SOLUTION INTRAMUSCULAR; INTRAVENOUS
Status: DISCONTINUED | OUTPATIENT
Start: 2024-08-17 | End: 2024-08-17 | Stop reason: HOSPADM

## 2024-08-17 RX ORDER — METRONIDAZOLE 500 MG/100ML
500 INJECTION, SOLUTION INTRAVENOUS EVERY 8 HOURS SCHEDULED
Status: DISCONTINUED | OUTPATIENT
Start: 2024-08-17 | End: 2024-08-17

## 2024-08-17 RX ORDER — DROPERIDOL 2.5 MG/ML
0.62 INJECTION, SOLUTION INTRAMUSCULAR; INTRAVENOUS
Status: DISCONTINUED | OUTPATIENT
Start: 2024-08-17 | End: 2024-08-17 | Stop reason: HOSPADM

## 2024-08-17 RX ORDER — PROPOFOL 10 MG/ML
VIAL (ML) INTRAVENOUS AS NEEDED
Status: DISCONTINUED | OUTPATIENT
Start: 2024-08-17 | End: 2024-08-17 | Stop reason: SURG

## 2024-08-17 RX ORDER — DEXAMETHASONE SODIUM PHOSPHATE 4 MG/ML
INJECTION, SOLUTION INTRA-ARTICULAR; INTRALESIONAL; INTRAMUSCULAR; INTRAVENOUS; SOFT TISSUE AS NEEDED
Status: DISCONTINUED | OUTPATIENT
Start: 2024-08-17 | End: 2024-08-17 | Stop reason: SURG

## 2024-08-17 RX ORDER — SODIUM CHLORIDE 9 MG/ML
INJECTION, SOLUTION INTRAVENOUS AS NEEDED
Status: DISCONTINUED | OUTPATIENT
Start: 2024-08-17 | End: 2024-08-17 | Stop reason: HOSPADM

## 2024-08-17 RX ORDER — ROCURONIUM BROMIDE 10 MG/ML
INJECTION, SOLUTION INTRAVENOUS AS NEEDED
Status: DISCONTINUED | OUTPATIENT
Start: 2024-08-17 | End: 2024-08-17 | Stop reason: SURG

## 2024-08-17 RX ORDER — SODIUM CHLORIDE 0.9 % (FLUSH) 0.9 %
3 SYRINGE (ML) INJECTION EVERY 12 HOURS SCHEDULED
Status: DISCONTINUED | OUTPATIENT
Start: 2024-08-17 | End: 2024-08-17 | Stop reason: HOSPADM

## 2024-08-17 RX ORDER — HYDROMORPHONE HYDROCHLORIDE 1 MG/ML
0.5 INJECTION, SOLUTION INTRAMUSCULAR; INTRAVENOUS; SUBCUTANEOUS
Status: DISCONTINUED | OUTPATIENT
Start: 2024-08-17 | End: 2024-08-18 | Stop reason: HOSPADM

## 2024-08-17 RX ORDER — DROPERIDOL 2.5 MG/ML
0.62 INJECTION, SOLUTION INTRAMUSCULAR; INTRAVENOUS ONCE AS NEEDED
Status: DISCONTINUED | OUTPATIENT
Start: 2024-08-17 | End: 2024-08-17 | Stop reason: HOSPADM

## 2024-08-17 RX ORDER — ONDANSETRON 2 MG/ML
INJECTION INTRAMUSCULAR; INTRAVENOUS AS NEEDED
Status: DISCONTINUED | OUTPATIENT
Start: 2024-08-17 | End: 2024-08-17 | Stop reason: SURG

## 2024-08-17 RX ORDER — SODIUM CHLORIDE 0.9 % (FLUSH) 0.9 %
10 SYRINGE (ML) INJECTION AS NEEDED
Status: CANCELLED | OUTPATIENT
Start: 2024-08-17

## 2024-08-17 RX ORDER — NITROGLYCERIN 0.4 MG/1
0.4 TABLET SUBLINGUAL
Status: DISCONTINUED | OUTPATIENT
Start: 2024-08-17 | End: 2024-08-18 | Stop reason: HOSPADM

## 2024-08-17 RX ADMIN — DEXAMETHASONE SODIUM PHOSPHATE 8 MG: 4 INJECTION INTRA-ARTICULAR; INTRALESIONAL; INTRAMUSCULAR; INTRAVENOUS; SOFT TISSUE at 10:28

## 2024-08-17 RX ADMIN — FENTANYL CITRATE 50 MCG: 50 INJECTION, SOLUTION INTRAMUSCULAR; INTRAVENOUS at 11:32

## 2024-08-17 RX ADMIN — RACEPINEPHRINE HYDROCHLORIDE 0.5 ML: 11.25 SOLUTION RESPIRATORY (INHALATION) at 12:32

## 2024-08-17 RX ADMIN — SODIUM CHLORIDE 2000 MG: 900 INJECTION INTRAVENOUS at 05:00

## 2024-08-17 RX ADMIN — Medication 160 MG: at 10:18

## 2024-08-17 RX ADMIN — FENTANYL CITRATE 50 MCG: 50 INJECTION, SOLUTION INTRAMUSCULAR; INTRAVENOUS at 10:47

## 2024-08-17 RX ADMIN — RACEPINEPHRINE HYDROCHLORIDE 0.5 ML: 11.25 SOLUTION RESPIRATORY (INHALATION) at 11:45

## 2024-08-17 RX ADMIN — Medication 10 ML: at 20:46

## 2024-08-17 RX ADMIN — ROCURONIUM BROMIDE 45 MG: 10 INJECTION INTRAVENOUS at 10:20

## 2024-08-17 RX ADMIN — EPHEDRINE SULFATE 5 MG: 50 INJECTION INTRAVENOUS at 10:31

## 2024-08-17 RX ADMIN — LIDOCAINE HYDROCHLORIDE 4 ML: 40 INJECTION, SOLUTION RETROBULBAR; TOPICAL at 12:40

## 2024-08-17 RX ADMIN — Medication 10 ML: at 00:31

## 2024-08-17 RX ADMIN — EPHEDRINE SULFATE 10 MG: 50 INJECTION INTRAVENOUS at 10:22

## 2024-08-17 RX ADMIN — ONDANSETRON 4 MG: 2 INJECTION INTRAMUSCULAR; INTRAVENOUS at 10:55

## 2024-08-17 RX ADMIN — HYDROMORPHONE HYDROCHLORIDE 0.5 MG: 1 INJECTION, SOLUTION INTRAMUSCULAR; INTRAVENOUS; SUBCUTANEOUS at 14:35

## 2024-08-17 RX ADMIN — PROPOFOL 150 MG: 10 INJECTION, EMULSION INTRAVENOUS at 10:18

## 2024-08-17 RX ADMIN — BUDESONIDE 0.5 MG: 0.5 SUSPENSION RESPIRATORY (INHALATION) at 12:38

## 2024-08-17 RX ADMIN — LIDOCAINE HYDROCHLORIDE 50 MG: 10 INJECTION, SOLUTION EPIDURAL; INFILTRATION; INTRACAUDAL; PERINEURAL at 10:18

## 2024-08-17 RX ADMIN — ROCURONIUM BROMIDE 5 MG: 10 INJECTION INTRAVENOUS at 10:18

## 2024-08-17 RX ADMIN — SODIUM CHLORIDE, POTASSIUM CHLORIDE, SODIUM LACTATE AND CALCIUM CHLORIDE: 600; 310; 30; 20 INJECTION, SOLUTION INTRAVENOUS at 10:13

## 2024-08-17 RX ADMIN — MORPHINE SULFATE 1 MG: 2 INJECTION, SOLUTION INTRAMUSCULAR; INTRAVENOUS at 00:40

## 2024-08-17 RX ADMIN — MORPHINE SULFATE 1 MG: 2 INJECTION, SOLUTION INTRAMUSCULAR; INTRAVENOUS at 20:54

## 2024-08-17 RX ADMIN — METRONIDAZOLE 500 MG: 500 INJECTION, SOLUTION INTRAVENOUS at 01:01

## 2024-08-17 RX ADMIN — SUGAMMADEX 400 MG: 100 INJECTION, SOLUTION INTRAVENOUS at 11:04

## 2024-08-17 RX ADMIN — OXYCODONE HYDROCHLORIDE AND ACETAMINOPHEN 1 TABLET: 5; 325 TABLET ORAL at 18:00

## 2024-08-17 RX ADMIN — ROSUVASTATIN CALCIUM 40 MG: 20 TABLET, COATED ORAL at 18:00

## 2024-08-17 RX ADMIN — DEXAMETHASONE SODIUM PHOSPHATE 4 MG: 4 INJECTION, SOLUTION INTRA-ARTICULAR; INTRALESIONAL; INTRAMUSCULAR; INTRAVENOUS; SOFT TISSUE at 11:45

## 2024-08-17 RX ADMIN — FENTANYL CITRATE 50 MCG: 50 INJECTION, SOLUTION INTRAMUSCULAR; INTRAVENOUS at 10:18

## 2024-08-17 RX ADMIN — SODIUM CHLORIDE 100 ML/HR: 9 INJECTION, SOLUTION INTRAVENOUS at 00:40

## 2024-08-17 NOTE — CONSULTS
Patient Name:  Chris Tyler  YOB: 1960  [id number]       Patient Care Team:  Provider, No Known as PCP - General      General Surgery Consult Note     Date of Consultation: 08/17/24    Referring Physician - Cintia Cerda MD    Reason for Consult -acute appendicitis    Subjective     I have been asked to see  Chris Tyler , a 64 y.o. male in consultation for acute appendicitis from Dr. Cerda.  The patient had acute onset of vague abdominal pain associated with nausea and vomiting starting on the late evening of 8/14/2024.  The pain worsen and the patient then presented to the emergency room on 8/16/2024 for further evaluation.  No fevers or chills associated with the pain.      Allergy: No Known Allergies    Medications:  cefTRIAXone, 2,000 mg, Intravenous, Q24H  metroNIDAZOLE, 500 mg, Intravenous, Q8H  rosuvastatin, 40 mg, Oral, Daily  [Transfer Hold] sodium chloride, 10 mL, Intravenous, Q12H      sodium chloride, 100 mL/hr, Last Rate: 100 mL/hr (08/17/24 0808)      No current facility-administered medications on file prior to encounter.     Current Outpatient Medications on File Prior to Encounter   Medication Sig    aspirin 81 MG EC tablet Take 1 tablet by mouth Daily.    ezetimibe (ZETIA) 10 MG tablet Take 1 tablet by mouth Daily.    lisinopril (PRINIVIL,ZESTRIL) 20 MG tablet Take 1 tablet by mouth Daily.    rosuvastatin (CRESTOR) 20 MG tablet Take 1 tablet by mouth Daily.    spironolactone (ALDACTONE) 25 MG tablet Take 1 tablet by mouth Daily.    nabumetone (RELAFEN) 750 MG tablet Take 1 tablet by mouth 2 (Two) Times a Day.    Omega-3 Fatty Acids (OMEGA 3 PO) Take  by mouth.       PMHx:   Past Medical History:   Diagnosis Date    Heart disease     History of quadruple bypass     Hyperlipidemia     Hypertension     Myocardial infarction     Ulcerative colitis        Past Surgical History:  Past Surgical History:   Procedure Laterality Date    CORONARY ARTERY BYPASS GRAFT      LUNG BIOPSY       "    Family History:   Family History   Problem Relation Age of Onset    Heart disease Mother     Heart disease Father     Cancer Sister         Social History:   Social History     Socioeconomic History    Marital status:    Tobacco Use    Smoking status: Every Day     Types: Cigars    Tobacco comments:     1 cigar QD   Substance and Sexual Activity    Alcohol use: Yes     Comment: Moderate    Drug use: No    Sexual activity: Defer         Review of Systems     Constitutional: No fevers, chills or malaise   Eyes: Denies visual changes    Cardiovascular: Denies chest pain, palpitations   Pulmonary: Denies cough or shortness of breath   Abdominal/ GI: See HPI    Genitourinary: Denies dysuria or hematuria   Musculoskeletal: Denies any but chronic joint aches, pains or deformities   Psychiatric: No recent mood changes   Neurologic: No paresthesias or loss of function          Objective     Physical Exam:      Vital Signs  BP 92/58   Pulse 51   Temp 97.8 °F (36.6 °C) (Oral)   Resp 20   Ht 172.7 cm (68\")   Wt 80.7 kg (178 lb)   SpO2 90%   BMI 27.06 kg/m²     Intake/Output Summary (Last 24 hours) at 8/17/2024 0847  Last data filed at 8/17/2024 0808  Gross per 24 hour   Intake 846.67 ml   Output --   Net 846.67 ml         Physical Exam:    Head: Normocephalic, atraumatic.   Eyes: Pupils equal, round, react to light and accommodation.   Mouth: Oral mucosa without lesions  Neck: No masses, lymphadenopathy or carotid bruits bilaterally  CV: Rhythm and rate regular, no murmurs, rubs or gallops  Lungs: Clear to auscultation bilaterally  Abdomen: Bowel sounds positive, soft, tender to palpation in right lower quadrant with focal rebound and guarding  Groin : No obvious hernias bilaterally  Extremities:  No cyanosis, clubbing or edema bilaterally  Lymphatics: No abnormal lymphadenopathy appreciated  Neurologic: No gross deficits      Results Review: I have personally reviewed all of the recent lab and imaging results " available at this time.   WBC 10.3  Hemoglobin 13.7  Creatinine 1.16  LFTs within normal limits  INR 0.98  Lactate 1.1  Lipase 36    CT abdomen pelvis 8/16/2024:  Impression:  1.Acute appendicitis without evidence of perforation or abscess.  2.Suspected secondary inflammation of the adjacent terminal ileum.  3.Cholelithiasis.       Assessment & Plan     Assessment and Plan:    Patient with acute appendicitis.  We discussed the risk, benefits, and alternatives to laparoscopic appendectomy and the patient was agreeable.  N.p.o., IV fluids, IV antibiotics until the time of operation which we will plan for this morning.      I discussed the patient's findings and my recommendations with the patient and/or family, as well as the primary team     Alex Mott MD  08/17/24  08:47 EDT

## 2024-08-17 NOTE — PLAN OF CARE
Goal Outcome Evaluation:     Patient admitted overnight for acute appendicitis. Patient remained NPO throughout the shift and scheduled IV antibiotics were continued. Patient complained of intermittent pain which was relieved somewhat with PRN analgesics and environmental adjustments. Continuous fluids administered overnight. Patient voided adequately by walking to the restroom with any needed assistance. Patient remained on RA and A&O X4 throughout the shift. EKG performed in the AM prior to expected procedure. Call light left continuously with patient. No further concerns at this time, will continue to monitor.       Problem: Adult Inpatient Plan of Care  Goal: Plan of Care Review  Outcome: Ongoing, Progressing  Goal: Patient-Specific Goal (Individualized)  Outcome: Ongoing, Progressing  Goal: Absence of Hospital-Acquired Illness or Injury  Outcome: Ongoing, Progressing  Intervention: Identify and Manage Fall Risk  Recent Flowsheet Documentation  Taken 8/17/2024 0400 by Theresa Ray RN  Safety Promotion/Fall Prevention:   activity supervised   assistive device/personal items within reach   clutter free environment maintained   fall prevention program maintained   lighting adjusted   mobility aid in reach   nonskid shoes/slippers when out of bed   muscle strengthening facilitated   room organization consistent   safety round/check completed   toileting scheduled  Taken 8/17/2024 0200 by Theresa Ray RN  Safety Promotion/Fall Prevention:   activity supervised   assistive device/personal items within reach   clutter free environment maintained   fall prevention program maintained   lighting adjusted   mobility aid in reach   nonskid shoes/slippers when out of bed   muscle strengthening facilitated   room organization consistent   safety round/check completed   toileting scheduled  Taken 8/17/2024 0000 by Theresa Ray RN  Safety Promotion/Fall Prevention:   activity supervised   assistive  device/personal items within reach   clutter free environment maintained   fall prevention program maintained   lighting adjusted   mobility aid in reach   nonskid shoes/slippers when out of bed   muscle strengthening facilitated   room organization consistent   safety round/check completed   toileting scheduled  Intervention: Prevent Skin Injury  Recent Flowsheet Documentation  Taken 8/17/2024 0400 by Theresa Ray RN  Body Position:   upper extremity elevated   lower extremity elevated   position changed independently  Skin Protection: adhesive use limited  Taken 8/17/2024 0200 by Theresa Ray RN  Body Position:   upper extremity elevated   lower extremity elevated   position changed independently  Taken 8/17/2024 0000 by Theresa Ray RN  Body Position:   upper extremity elevated   lower extremity elevated   position changed independently  Skin Protection:   adhesive use limited   incontinence pads utilized   tubing/devices free from skin contact   transparent dressing maintained  Intervention: Prevent and Manage VTE (Venous Thromboembolism) Risk  Recent Flowsheet Documentation  Taken 8/17/2024 0400 by Theresa Ray RN  Activity Management: activity encouraged  Taken 8/17/2024 0200 by Theresa Ray RN  Activity Management: activity encouraged  Taken 8/17/2024 0000 by Theresa Ray RN  Activity Management: activity encouraged  VTE Prevention/Management: (patient regularly ambulating to restroom) --  Range of Motion:   active ROM (range of motion) encouraged   ROM (range of motion) performed  Goal: Optimal Comfort and Wellbeing  Outcome: Ongoing, Progressing  Intervention: Monitor Pain and Promote Comfort  Recent Flowsheet Documentation  Taken 8/17/2024 0040 by Theresa Ray RN  Pain Management Interventions:   breathing exercises   care clustered   pillow support provided   position adjusted   pain management plan reviewed with patient/caregiver   quiet environment facilitated   see  MAR   relaxation techniques promoted   unnecessary movement minimized  Intervention: Provide Person-Centered Care  Recent Flowsheet Documentation  Taken 8/17/2024 0400 by Theresa Ray RN  Trust Relationship/Rapport:   care explained   choices provided   emotional support provided   empathic listening provided   questions answered   questions encouraged   reassurance provided   thoughts/feelings acknowledged  Taken 8/17/2024 0000 by Theresa Ray RN  Trust Relationship/Rapport:   care explained   choices provided   emotional support provided   empathic listening provided   questions answered   questions encouraged   reassurance provided   thoughts/feelings acknowledged  Goal: Readiness for Transition of Care  Outcome: Ongoing, Progressing  Intervention: Mutually Develop Transition Plan  Recent Flowsheet Documentation  Taken 8/16/2024 2353 by Theresa Ray RN  Transportation Anticipated: family or friend will provide  Patient/Family Anticipated Services at Transition: none  Patient/Family Anticipates Transition to: home  Taken 8/16/2024 2346 by Theresa Ray RN  Equipment Currently Used at Home: none     Problem: Adjustment to Illness (Sepsis/Septic Shock)  Goal: Optimal Coping  Outcome: Ongoing, Progressing  Intervention: Optimize Psychosocial Adjustment to Illness  Recent Flowsheet Documentation  Taken 8/17/2024 0000 by Theresa Ray RN  Supportive Measures:   active listening utilized   relaxation techniques promoted   self-care encouraged  Family/Support System Care:   self-care encouraged   support provided   presence promoted     Problem: Bleeding (Sepsis/Septic Shock)  Goal: Absence of Bleeding  Outcome: Ongoing, Progressing  Intervention: Monitor and Manage Bleeding  Recent Flowsheet Documentation  Taken 8/17/2024 0000 by Theresa Ray RN  Bleeding Precautions: blood pressure closely monitored     Problem: Glycemic Control Impaired (Sepsis/Septic Shock)  Goal: Blood Glucose Level  Within Desired Range  Outcome: Ongoing, Progressing     Problem: Infection Progression (Sepsis/Septic Shock)  Goal: Absence of Infection Signs and Symptoms  Outcome: Ongoing, Progressing  Intervention: Initiate Sepsis Management  Recent Flowsheet Documentation  Taken 8/17/2024 0000 by Theresa Ray RN  Stabilization Measures: legs elevated  Infection Management: aseptic technique maintained  Intervention: Promote Recovery  Recent Flowsheet Documentation  Taken 8/17/2024 0400 by Theresa Ray RN  Activity Management: activity encouraged  Taken 8/17/2024 0200 by Theresa Ray RN  Activity Management: activity encouraged  Taken 8/17/2024 0000 by Theresa Ray RN  Activity Management: activity encouraged  Airway/Ventilation Support: pulmonary hygiene promoted  Sleep/Rest Enhancement:   awakenings minimized   consistent schedule promoted   regular sleep/rest pattern promoted   relaxation techniques promoted   therapeutic touch utilized  Intervention: Promote Stabilization  Recent Flowsheet Documentation  Taken 8/17/2024 0000 by Theresa Ray RN  Fluid/Electrolyte Management: intravenous fluids adjusted     Problem: Nutrition Impaired (Sepsis/Septic Shock)  Goal: Optimal Nutrition Intake  Outcome: Ongoing, Progressing

## 2024-08-17 NOTE — ED NOTES
Chris Tyler    Nursing Report ED to Floor:  Mental status: A&Ox4, IND  Ambulatory status: IND  Oxygen Therapy:  RA  Cardiac Rhythm: NS  Admitted from: Home  Safety Concerns:  n/a  Social Issues: n/a  ED Room #:  29    ED Nurse Phone Extension - [phone] or may call [phone].      HPI:   Chief Complaint   Patient presents with    Abdominal Pain       Past Medical History:  Past Medical History:   Diagnosis Date    Heart disease     History of quadruple bypass     Hyperlipidemia     Hypertension     Myocardial infarction     Ulcerative colitis         Past Surgical History:  Past Surgical History:   Procedure Laterality Date    CORONARY ARTERY BYPASS GRAFT      LUNG BIOPSY          Admitting Doctor:   Tomas Ross III, DO    Consulting Provider(s):  Consults       No orders found from 7/18/2024 to 8/17/2024.             Admitting Diagnosis:   There were no encounter diagnoses.    Most Recent Vitals:   Vitals:    08/16/24 2211 08/16/24 2216 08/16/24 2221 08/16/24 2226   BP:       BP Location:       Patient Position:       Pulse: 59 58 58 58   Resp:       Temp:       TempSrc:       SpO2: 95% 96% 94% 94%   Weight:       Height:           Active LDAs/IV Access:   Lines, Drains & Airways       Active LDAs       Name Placement date Placement time Site Days    Peripheral IV 08/16/24 2006 Right Antecubital 08/16/24 2006  Antecubital  less than 1                    Labs (abnormal labs have a star):   Labs Reviewed   COMPREHENSIVE METABOLIC PANEL - Abnormal; Notable for the following components:       Result Value    Glucose 127 (*)     All other components within normal limits    Narrative:     GFR Normal >60  Chronic Kidney Disease <60  Kidney Failure <15     URINALYSIS W/ MICROSCOPIC IF INDICATED (NO CULTURE) - Abnormal; Notable for the following components:    Color, UA Dark Yellow (*)     Ketones, UA Trace (*)     Protein, UA Trace (*)     Leuk Esterase, UA Trace (*)     All other components within normal limits   CBC  WITH AUTO DIFFERENTIAL - Abnormal; Notable for the following components:    WBC 13.33 (*)     Neutrophils, Absolute 8.14 (*)     Lymphocytes, Absolute 3.43 (*)     Monocytes, Absolute 1.43 (*)     All other components within normal limits   URINALYSIS, MICROSCOPIC ONLY - Abnormal; Notable for the following components:    RBC, UA 3-5 (*)     Mucus, UA Moderate/2+ (*)     All other components within normal limits   LIPASE - Normal   LACTIC ACID, PLASMA - Normal   BLOOD CULTURE   BLOOD CULTURE   RAINBOW DRAW    Narrative:     The following orders were created for panel order [place] Draw.  Procedure                               Abnormality         Status                     ---------                               -----------         ------                     Green Top (Gel)[[id number]]                                  Final result               Lavender Top[[id number]]                                     Final result               Gold Top - SST[[id number]]                                   Final result               Chau Top[[id number]]                                         Final result               Light Blue Top[[id number]]                                   Final result                 Please view results for these tests on the individual orders.   CBC AND DIFFERENTIAL    Narrative:     The following orders were created for panel order CBC & Differential.  Procedure                               Abnormality         Status                     ---------                               -----------         ------                     CBC Auto Differential[[id number]]        Abnormal            Final result                 Please view results for these tests on the individual orders.   GREEN TOP   LAVENDER TOP   GOLD TOP - SST   GRAY TOP   LIGHT BLUE TOP       Meds Given in ED:   Medications   Sodium Chloride (PF) 0.9 % 10 mL (has no administration in time range)   piperacillin-tazobactam (ZOSYN) 3.375 g IVPB in 100 mL NS  MBP (CD) (3.375 g Intravenous New Bag 8/16/24 2249)   ondansetron (ZOFRAN) injection 4 mg (4 mg Intravenous Given 8/16/24 2053)   Morphine sulfate (PF) injection 4 mg (4 mg Intravenous Given 8/16/24 2054)   iopamidol (ISOVUE-300) 61 % injection 85 mL (85 mL Intravenous Given 8/16/24 2035)           Last NIH score:                                                          Dysphagia screening results:  Patient Factors Component (Dysphagia:Stroke or Rule-out)  Best Eye Response: 4-->(E4) spontaneous (08/16/24 2051)  Best Motor Response: 6-->(M6) obeys commands (08/16/24 2051)  Best Verbal Response: 5-->(V5) oriented (08/16/24 2051)  [place] Coma Scale Score: 15 (08/16/24 2051)     [place] Coma Scale:  No data recorded     CIWA:        Restraint Type:            Isolation Status:  No active isolations

## 2024-08-17 NOTE — PLAN OF CARE
Goal Outcome Evaluation:     Pt would like Dr. Mott to talk to him prior to signing consent form. Pt leaving floor at this time with OR transporter, notified of need of consent signed prior to surgery.     A/O. RA. SB. NS infusing @ 100. Standby. CHG bath given by PCT prior to leaving floor. NPO.

## 2024-08-17 NOTE — SIGNIFICANT NOTE
Patient with audible inspiratory stridor, mild distress  noted, VS stable, anesthesia at bedside, orders given and noted. Please see orders. Will continue to monitior

## 2024-08-17 NOTE — CONSULTS
Pulmonary/Critical Care Follow-up     LOS: 0 days   Patient Care Team:  Provider, No Known as PCP - General    Chief Complaint/reason for consult: Stridor postoperatively        Subjective     Patient had uneventful appendectomy today.  Postoperatively, he has developed stridor.  Anesthesia has treated with racemic epinephrine, lidocaine, Solu-Medrol with no significant improvement although there has been some slight improvement.  Patient has a history of issues with swallowing and has had some episodes at home similar to this he reported to staff.  Patient has a history of a CABG in the past with intubation and did not have issues postoperatively with prior procedures.  I was consulted by Dr. Bailon with anesthesia to evaluate the patient.    Patient reports that he is overall feeling better.  He does have some ongoing hoarseness/raspy voice.    History taken from: Staff.    PMH/FH/Social History were reviewed and updated appropriately in the electronic medical record.     Review of Systems:    Review of 14 systems was completed with positives and pertinent negatives noted in the subjective section.  All other systems reviewed and are negative.       Objective     Vital Signs  Temp:  [97.5 °F (36.4 °C)-98.4 °F (36.9 °C)] 97.5 °F (36.4 °C)  Heart Rate:  [] 79  Resp:  [12-21] 16  BP: ()/(50-76) 128/57  08/16 0701 - 08/17 0700  In: 100   Out: -   Body mass index is 27.06 kg/m².  S RR:  [8-12] 8  IV drips:  sodium chloride, Last Rate: 100 mL/hr (08/17/24 0808)       Physical Exam:     Constitutional:   Alert, in no acute distress   Head:   Normocephalic, atraumatic   Eyes:           Lids and lashes normal, conjunctivae and sclerae normal.  PER   ENMT:  Ears appear intact with no abnormalities noted     Lips normal.     Neck:  Trachea midline, no JVD   Lungs/Resp:    Normal effort, symmetric chest rise, no crepitus, intermittent stridor.               Heart/CV:   Regular rhythm and normal rate, no murmur    Abdomen/GI:       :    Deferred   Extremities/MSK:  No clubbing or cyanosis.  No edema.     Pulses:  Pulses palpable and equal bilaterally   Skin:  No bleeding, bruising or rash   Heme/Lymph:  No cervical or supraclavicular adenopathy.   Neurologic:    Psychiatric:    Moves all extremities with no obvious focal motor deficit.  Cranial nerves 2 - 12 grossly intact  Non-agitated, normal affect.    The above physical exam findings were reviewed and reflect my exam findings as of today's exam.   Electronically signed by:  Salas Oh MD  08/17/24  13:49 EDT      Results Review:     I reviewed the patient's new clinical results.   Results from last 7 days   Lab Units 08/17/24  0705 08/16/24 2006   SODIUM mmol/L 139 136   POTASSIUM mmol/L 4.4 4.0   CHLORIDE mmol/L 103 98   CO2 mmol/L 27.0 28.0   BUN mg/dL 15 16   CREATININE mg/dL 1.16 1.10   CALCIUM mg/dL 8.8 9.8   BILIRUBIN mg/dL 0.2 0.5   ALK PHOS U/L 65 82   ALT (SGPT) U/L 11 15   AST (SGOT) U/L 13 14   GLUCOSE mg/dL 91 127*     Results from last 7 days   Lab Units 08/17/24  0705 08/16/24 2006   WBC 10*3/mm3 10.34 13.33*   HEMOGLOBIN g/dL 13.7 15.4   HEMATOCRIT % 40.5 44.6   PLATELETS 10*3/mm3 238 245         Results from last 7 days   Lab Units 08/17/24  0705   MAGNESIUM mg/dL 2.2       I reviewed the patient's new imaging including images and reports.    Medication Review:   albuterol, , ,   cefTRIAXone, 2,000 mg, Intravenous, Q24H  metroNIDAZOLE, 500 mg, Intravenous, Q8H  rosuvastatin, 40 mg, Oral, Daily  [Transfer Hold] sodium chloride, 10 mL, Intravenous, Q12H  sodium chloride, 3 mL, Intravenous, Q12H      sodium chloride, 100 mL/hr, Last Rate: 100 mL/hr (08/17/24 0808)        Assessment & Plan         Acute appendicitis    Vocal cord dysfunction    I was evaluated by anesthesia to see this patient who has intermittent stridor postoperatively after general anesthesia for appendectomy.    I did a brief bronchoscopy to evaluate the upper airway, vocal  cords, epiglottis, and trachea.  Patient has evidence of vocal cord dysfunction.  Vocal cords were normal with no swelling or lesions.  There were no lesions within the proximal to mid trachea.  There was a fair amount of thick secretions in the posterior supraglottic region which were suctioned clear and this may represent some element of gastroesophageal reflux but there was no evidence of inflammation.    Given the patient's previous symptoms, I suspect he has a flare of some vocal cord dysfunction.  I do not see anything that would represent an acute emergency and I suspect his symptoms will continue to improve going forward.  If patient continues to have issues at home like he has been having, could consider ENT evaluation with consideration for speech therapy to address vocal cord dysfunction.  He might need a GI evaluation to rule out things such as esophageal dysmotility or stricture as these can play a role.    Please call if there are any additional questions.    ............MDM:    Problem(s) Moderate due to: 1 undiagnosed new problem with uncertain prognosis  Data: Moderate due to: Review or results of each unique test and Independent interpretation of a test performed by another physician/NPP (not separately reported)  Risk: Moderate due to: decision regarding minor surgery with identified patient or procedure risk factors    Moderate     Electronically signed by:    Salas Oh MD  08/17/24  13:49 EDT      *. Please note that portions of this note were completed with [employer] - a voice recognition program.

## 2024-08-17 NOTE — PROGRESS NOTES
[facility] Medicine Services  ADMISSION FOLLOW-UP NOTE          Patient admitted after midnight, H&P by my partner performed earlier on today's date reviewed.  Interim findings, labs, and charting also reviewed.        The [facility] Problem List has been managed and updated to include any new diagnoses:  Active Hospital Problems    Diagnosis  POA    **Acute appendicitis [K35.80]  Yes    Vocal cord dysfunction [J38.3]  Yes      Resolved Hospital Problems   No resolved problems to display.     In summary, this is a 65 yo male w benign pmhx who presented w RLQ pain, found to have acute appendicitis.   Post-op course c/w stridor evaluated by pulm in PACU w brief bronchoscopy w normal findings c/w vocal cord dysfunction    ADDITIONAL PLAN:  - d/c abx post-op given no complication in OR  - monitor w supportive care for vocal cord dysfunction (evaluated by pulm in consult, see note)  - likely d/c in AM    Expected Discharge 8/18 (Discharge date is tentative pending patient's medical condition and is subject to change)  Expected Discharge Date: 8/18/2024; Expected Discharge Time:      Cintia Cerda MD  08/17/24

## 2024-08-17 NOTE — ANESTHESIA PROCEDURE NOTES
Airway  Urgency: elective    Date/Time: 8/17/2024 10:19 AM  Airway not difficult    General Information and Staff    Patient location during procedure: OR  CRNA/CAA: Zhane Doe CRNA    Indications and Patient Condition  Indications for airway management: airway protection    Preoxygenated: yes  MILS not maintained throughout  Mask difficulty assessment: 1 - vent by mask    Final Airway Details  Final airway type: endotracheal airway      Successful airway: ETT  Cuffed: yes   Successful intubation technique: video laryngoscopy and RSI  Facilitating devices/methods: intubating stylet and cricoid pressure  Endotracheal tube insertion site: oral  Blade: [place]  Blade size: 3  ETT size (mm): 8.0  Cormack-Lehane Classification: grade I - full view of glottis  Placement verified by: chest auscultation and capnometry   Measured from: lips  ETT/EBT  to lips (cm): 20  Number of attempts at approach: 1  Assessment: lips, teeth, and gum same as pre-op and atraumatic intubation    Additional Comments  Negative epigastric sounds, Breath sound equal bilaterally with symmetric chest rise and fall

## 2024-08-17 NOTE — ANESTHESIA PREPROCEDURE EVALUATION
Anesthesia Evaluation     Patient summary reviewed and Nursing notes reviewed   NPO Solid Status: > 8 hours  NPO Liquid Status: > 8 hours           Airway   Mallampati: II  TM distance: >3 FB  Neck ROM: full  No difficulty expected  Dental - normal exam     Pulmonary - normal exam   Cardiovascular   Exercise tolerance: good (4-7 METS)    Rhythm: regular  Rate: normal    (+) CABG >6 Months      Neuro/Psych  GI/Hepatic/Renal/Endo      Musculoskeletal     Abdominal    Substance History      OB/GYN          Other                    Anesthesia Plan    ASA 3     general     (Rapid sequence induction)    Anesthetic plan, risks, benefits, and alternatives have been provided, discussed and informed consent has been obtained with: patient.    CODE STATUS:    Level Of Support Discussed With: Patient  Code Status (Patient has no pulse and is not breathing): CPR (Attempt to Resuscitate)  Medical Interventions (Patient has pulse or is breathing): Full Support

## 2024-08-17 NOTE — BRIEF OP NOTE
APPENDECTOMY LAPAROSCOPIC POSSIBLE OPEN  Progress Note    Chris Jayson  8/17/2024    Pre-op Diagnosis:   Acute appendicitis        Post-Op Diagnosis Codes:     * Acute appendicitis     Procedure/CPT® Codes:        Procedure(s):  APPENDECTOMY LAPAROSCOPIC              Surgeon(s):  Alex Mott MD    Anesthesia: General    Staff:   Circulator: Lissa Travis RN  Scrub Person: Jas Restrepo  Nursing Assistant: Chantal Li PCT         Estimated Blood Loss: minimal    Urine Voided: * No values recorded between 8/17/2024 10:13 AM and 8/17/2024 11:07 AM *    Specimens:                Specimens       ID Source Type Tests Collected By Collected At Frozen?    A Large Intestine, Appendix Tissue TISSUE PATHOLOGY EXAM   Alex Mott MD 8/17/24 1050                   Drains: * No LDAs found *    Findings: Acutely inflamed appendix without perforation or abscess        Complications: None          Alex Mott MD     Date: 8/17/2024  Time: 11:11 EDT

## 2024-08-17 NOTE — OP NOTE
Operative Report    Patient Name:  Chris Tyler  YOB: 1960  [id number]    8/17/2024      PREOPERATIVE DIAGNOSIS: Acute appendicitis       POSTOPERATIVE DIAGNOSIS: Same        PROCEDURE PERFORMED: Laparoscopic appendectomy        SURGEON: Alex Mott MD      ASSISTANT:  None        SPECIMENS: Appendix and contents        ANESTHESIA: General    EBL: Minimal        FINDINGS:  Acute appendicitis        INDICATIONS:      The patient is a 64 y.o. male with a history of abdominal pain, concerning for acute appendicitis . Pre-operative imaging including CT scan confirmed the diagnosis. The risks and benefits of laparoscopic appendectomy, possible open were discussed with the patient and they agreed to proceed.          DESCRIPTION OF PROCEDURE:      After obtaining informed consent, the patient was taken to the operating room and placed in the supine position. After appropriate DVT and antibiotic prophylaxis, general anesthesia was induced. The abdomen was prepped and draped in standard sterile fashion, and after infiltrating the skin with local anesthetic, a 12mm skin incision was made superior to the umbilicus. Blunt dissection was carried down to the base of the umbilicus, which was grasped with a Kocher clamp and elevated anteriorly. A vertical midline incision was made at the base of the umbilicus, and blunt dissection was carried down into the peritoneal cavity. A stay suture of 0 Vicryl was then placed in figure-of-eight fashion around the defect, and a blunt trocar advanced without difficulty into the abdominal cavity. The abdomen was insufflated with carbon dioxide gas to a pressure of 15 mmHg, and a laparoscope advanced through the trocar and the abdominal contents were inspected. There was no evidence of bowel, bladder, or visceral injury with entrance of the trocar. At this point, after infiltrating the skin with local anesthetic, a standard laparoscopic appendectomy trocar placement  schema was followed, with care taken to avoid the bladder.     The right lower quadrant was inspected. An acutely inflamed appendix was identified with additional adhesions between the terminal ileum and the lateral abdominal wall.  Using meticulous blunt dissection the base of the appendix was circumferentially cleared, and the appendix was divided flush with the cecum using a stapling device, with care taken not to encroach upon the ileocecal valve. The Enseal device was used to divide the mesoappendix.  Adhesions between the terminal ileum and the abdominal wall were taken down using the Enseal device.  The appendix was then placed in an Endo Catch bag and removed through the periumbilical trocar site. The specimen was then sent to pathology as a permanent specimen.     The mesenteric and cecal staple lines were inspected, and free of bleeding or leak. The right lower quadrant was irrigated with normal saline until clear, and there was no bleeding seen.  The abdomen was desufflated and reinsufflated to make sure the pneumoperitoneum was not tamponading any bleeding, and no bleeding was identified.      All trocars were removed under direct and laparoscopic visualization. The fascia at the periumbilical incision was closed using 0 Vicryl suture. The wounds were closed in each area using absorbable subcuticular suture. The incisions were dressed in standard sterile fashion. The patient recovered from anesthesia, was extubated in the operating room, and transferred to the PACU in stable condition.  All sponge and needle counts were correct times two at the completion of the procedure.     COMPLICATIONS: None          Alex Mott MD  8/17/2024  11:12 EDT

## 2024-08-17 NOTE — NURSING NOTE
Dr. BAILON, and Dr. HERR at bedside patient status improved. Patient okay to return to floor per Dr. Bailon.

## 2024-08-17 NOTE — ANESTHESIA POSTPROCEDURE EVALUATION
Patient: Chris Tyler    Procedure Summary       Date: 08/17/24 Room / Location:  [facility]    Anesthesia Start: 1013 Anesthesia Stop: 1117    Procedure: APPENDECTOMY LAPAROSCOPIC (Abdomen) Diagnosis: Acute appendicitis    Surgeons: Alex Mott MD Provider: Tacho Bailon MD    Anesthesia Type: general ASA Status: 3            Anesthesia Type: general    Vitals  Vitals Value Taken Time   /67 08/17/24 1118   Temp 97.8 °F (36.6 °C) 08/17/24 1118   Pulse 94 08/17/24 1118   Resp 12 08/17/24 1118   SpO2 99 % 08/17/24 1118           Post Anesthesia Care and Evaluation    Patient location during evaluation: PACU  Patient participation: complete - patient participated  Level of consciousness: awake and alert  Pain score: 0  Pain management: adequate    Airway patency: patent  Anesthetic complications: No anesthetic complications  PONV Status: none  Cardiovascular status: hemodynamically stable and acceptable  Respiratory status: nonlabored ventilation, acceptable and nasal cannula  Hydration status: acceptable

## 2024-08-17 NOTE — H&P
[facility] Medicine Services  HISTORY AND PHYSICAL    Patient Name: Chris Tyler  : 1960  MRN: [id number]  Primary Care Physician: Provider, No Known  Date of admission: 2024      Subjective   Subjective     Chief Complaint:  Abdominal pain    HPI:  Chris Tyler is a 64 y.o. male who states that he noticed onset of abdominal pain on Tuesday (3 days ago).  He states that he specifically developed right lower quadrant abdominal pain yesterday (Thursday 8/15).  He describes the pain as sharp, nonradiating, made worse with any movement.  He denies any fever or chills.  He confirms nausea with 1 bout of emesis yesterday.  No recent abdominal trauma.  He came to the ED for further evaluation today ().  Workup in the ED included CT abdomen/pelvis, radiology read of which mentions acute appendicitis with no evidence of perforation or abscess.  Surgery service was called by the ED who recommended admission to the hospitalist service.  He also denies chest pain, shortness of breath, focal weakness, slurred speech/facial droop, dizziness/lightheadedness, or syncope.  He confirms that he uses his treadmill for exercise daily, and can easily walk up 1-2 flights of stairs without becoming short of breath or experiencing chest pain.  His medical history is significant for hypertension, hyperlipidemia, coronary artery disease with four-vessel CABG in  performed here, and ulcerative colitis.      Personal History     Past Medical History:   Diagnosis Date    Heart disease     History of quadruple bypass     Hyperlipidemia     Hypertension     Myocardial infarction     Ulcerative colitis            Past Surgical History:   Procedure Laterality Date    CORONARY ARTERY BYPASS GRAFT      LUNG BIOPSY         Family History: family history includes Cancer in his sister; Heart disease in his father and mother.     Social History:  reports that he has been smoking cigars. He does not have  any smokeless tobacco history on file. He reports current alcohol use. He reports that he does not use drugs.  Social History     Social History Narrative    Not on file       Medications:  Available home medication information reviewed.  Omega-3 Fatty Acids, aspirin, ezetimibe, lisinopril, nabumetone, rosuvastatin, and spironolactone    No Known Allergies    Objective   Objective     Vital Signs:   Temp:  [98.1 °F (36.7 °C)-98.4 °F (36.9 °C)] 98.1 °F (36.7 °C)  Heart Rate:  [56-71] 58  Resp:  [18] 18  BP: (112-136)/(66-76) 121/76       Physical Exam   Constitutional: Awake, alert, NAD, pleasant.  Eyes: PERRLA, sclerae anicteric, no conjunctival injection  HENT: NCAT, mucous membranes moist  Neck: Supple, no thyromegaly, no lymphadenopathy, trachea midline  Respiratory: Clear to auscultation bilaterally, nonlabored respirations   Cardiovascular: RRR, no murmurs, rubs, or gallops, palpable pedal pulses bilaterally  Gastrointestinal: Positive bowel sounds, soft, RLQ TTP, no peritoneal signs, nondistended  Musculoskeletal: No bilateral ankle edema, no clubbing or cyanosis to extremities  Psychiatric: Appropriate affect, cooperative  Neurologic: Oriented x 3, strength symmetric in all extremities, Cranial Nerves grossly intact to confrontation, speech clear  Skin: No rashes, normal turgor.    Result Review:  I have personally reviewed the results from the time of this admission to 8/17/2024 00:32 EDT and agree with these findings:  [x]  Laboratory list / accordion  []  Microbiology  [x]  Radiology  [x]  EKG/Telemetry   []  Cardiology/Vascular   []  Pathology  []  Old records  []  Other:  Most notable findings include: Reviewed radiology report from CT abdomen/pelvis.  Reviewed available labs.  Will order EKG, given history of coronary artery disease.  Telemetry monitor in room shows sinus bradycardia during my exam, rate fluctuates between 57-62 bpm.      LAB RESULTS:      Lab 08/16/24 2006   WBC 13.33*   HEMOGLOBIN  15.4   HEMATOCRIT 44.6   PLATELETS 245   NEUTROS ABS 8.14*   IMMATURE GRANS (ABS) 0.05   LYMPHS ABS 3.43*   MONOS ABS 1.43*   EOS ABS 0.20   MCV 95.5   LACTATE 1.1         Lab 08/16/24 2006   SODIUM 136   POTASSIUM 4.0   CHLORIDE 98   CO2 28.0   ANION GAP 10.0   BUN 16   CREATININE 1.10   EGFR 75.0   GLUCOSE 127*   CALCIUM 9.8         Lab 08/16/24 2006   TOTAL PROTEIN 7.5   ALBUMIN 4.1   GLOBULIN 3.4   ALT (SGPT) 15   AST (SGOT) 14   BILIRUBIN 0.5   ALK PHOS 82   LIPASE 36                     UA          8/16/2024    20:08   Urinalysis   Squamous Epithelial Cells, UA 0-2    Specific Gravity, UA 1.026    Ketones, UA Trace    Blood, UA Negative    Leukocytes, UA Trace    Nitrite, UA Negative    RBC, UA 3-5    WBC, UA 0-2    Bacteria, UA None Seen        Microbiology Results (last 10 days)       ** No results found for the last 240 hours. **            CT Abdomen Pelvis With Contrast    Result Date: 8/16/2024  CT ABDOMEN PELVIS W CONTRAST Date of Exam: 8/16/2024 8:30 PM EDT Indication: rlq pain  r/o appendicitis. Comparison: None available. Technique: Axial CT images were obtained of the abdomen and pelvis following the uneventful intravenous administration of iodinated contrast. Reconstructed coronal and sagittal images were also obtained. Automated exposure control and iterative construction methods were used. Findings: The lung bases are clear bilaterally. A gallstone is noted in the gallbladder without CT evidence of cholecystitis. The liver, spleen, pancreas and adrenal glands appear unremarkable. Both kidneys appear normal. Moderate atherosclerotic changes are noted in the abdominal aorta and its major branches. No adenopathy or free fluid is seen in the abdomen or pelvis. The urinary bladder, seminal vesicles and prostate appear within normal limits for age. The appendix is thickened measuring 1.2 cm in diameter. Surrounding infiltrative changes are noted consistent with acute appendicitis. Secondary  inflammatory changes of the terminal ileum are suspected. No free air or abscess is identified. The bowel otherwise appears unremarkable. There is a 0.2 cm anterolisthesis of L4 on L5. No focal osseous lesion is seen.     Impression: Impression: 1.Acute appendicitis without evidence of perforation or abscess. 2.Suspected secondary inflammation of the adjacent terminal ileum. 3.Cholelithiasis. Electronically Signed: Shine Lua MD  8/16/2024 9:35 PM EDT  Workstation ID: [id number]         Assessment & Plan   Assessment & Plan       Acute appendicitis      64M with acute appendicitis    Acute appendicitis  - NPO.  - Pain control with oral agents, IV if needed.  - IVF with 0.9 NS.  - Nausea control with IV Zofran as needed.  - IV antibiotic coverage with ceftriaxone and metronidazole.  - Hold aspirin from home regimen.  - Check a.m. EKG.  - General Surgery consult, called from the ED, will follow up their recommendations.    History of coronary artery disease s/p four-vessel CABG in 1997  - Continue telemetry.  - Hold aspirin.  - Continue lisinopril and statin from home meds.  - Check EKG in the morning.    Hypertension  - Continue lisinopril from home regimen.  - Hold spironolactone from home regimen for now.    Hyperlipidemia  - Continue statin from home regimen.  - Hold omega-3 fatty acids from home regimen for now.    Ulcerative colitis  - Not currently in exacerbation.  - He states he took a short course of prednisone for this in the past and has had no difficulty since then.        Total time spent: 79 minutes  Time spent includes time reviewing chart, face-to-face time, counseling patient/family/caregiver, ordering medications/tests/procedures, communicating with other health care professionals, documenting clinical information in the electronic health record, and coordination of care.       VTE Prophylaxis:  Mechanical VTE prophylaxis orders are present.          CODE STATUS:  full  Code Status and Medical  Interventions: CPR (Attempt to Resuscitate); Full Support   Ordered at: 08/17/24 0019     Level Of Support Discussed With:    Patient     Code Status (Patient has no pulse and is not breathing):    CPR (Attempt to Resuscitate)     Medical Interventions (Patient has pulse or is breathing):    Full Support       Expected Discharge   tbnicanor Ross,III, DO  08/17/24

## 2024-08-17 NOTE — PROCEDURES
"Bronchoscopy    Date/Time: 8/17/2024 1:50 PM    Performed by: Salas Oh MD  Authorized by: Salas Oh MD  Consent: The procedure was performed in an emergent situation. Verbal consent obtained.  Risks and benefits: risks, benefits and alternatives were discussed  Consent given by: patient  Patient identity confirmed: arm band  Time out: Immediately prior to procedure a \"time out\" was called to verify the correct patient, procedure, equipment, support staff and site/side marked as required.  Local anesthesia used: yes (Nebulized lidocaine given by anesthesia.  See their records.)    Anesthesia:  Local anesthesia used: yes (Nebulized lidocaine given by anesthesia.  See their records.)    Sedation:  Patient sedated: no    Comments: I did a brief bronchoscopy utilizing a new, disposable, video bronchoscope to evaluate the upper airway, vocal cords/glottis, and trachea in the setting of acute stridor postoperatively.  I advanced the scope through the left nare to the glottis without difficulty.  The epiglottis was normal in appearance without erythema or swelling.  The vocal cords were normal in appearance.  The vocal cords were intermittently adducted.  There was a moderate amount of thick secretions within the posterior supraglottic airway behind the retinoids which was suctioned clear.  The vocal cords intermittently opened without difficulty and were normal with vocalization.  There were no vocal cord lesions.  I advanced the scope through the vocal cords without difficulty into the proximal to mid trachea which showed no evidence of lesions or foreign body.  I then removed the scope.  The patient tolerated the procedure well.  No blood loss.    Recommendations: Usual postoperative management.  I expect patient to steadily improved.  Patient has a history of similar symptoms prior to his hospitalization and he may benefit from ENT evaluation, gastroenterology evaluation to evaluate for esophageal " dysmotility or stricture, and potentially would benefit from speech therapy for vocal cord dysfunction if it becomes a recurrent or persistent issue.

## 2024-08-18 VITALS
HEIGHT: 68 IN | WEIGHT: 178 LBS | OXYGEN SATURATION: 94 % | SYSTOLIC BLOOD PRESSURE: 127 MMHG | TEMPERATURE: 98.3 F | RESPIRATION RATE: 18 BRPM | BODY MASS INDEX: 26.98 KG/M2 | DIASTOLIC BLOOD PRESSURE: 62 MMHG | HEART RATE: 64 BPM

## 2024-08-18 LAB
ANION GAP SERPL CALCULATED.3IONS-SCNC: 11 MMOL/L (ref 5–15)
BASOPHILS # BLD AUTO: 0.02 10*3/MM3 (ref 0–0.2)
BASOPHILS NFR BLD AUTO: 0.1 % (ref 0–1.5)
BUN SERPL-MCNC: 16 MG/DL (ref 8–23)
BUN/CREAT SERPL: 15.7 (ref 7–25)
CALCIUM SPEC-SCNC: 9.1 MG/DL (ref 8.6–10.5)
CHLORIDE SERPL-SCNC: 100 MMOL/L (ref 98–107)
CO2 SERPL-SCNC: 25 MMOL/L (ref 22–29)
CREAT SERPL-MCNC: 1.02 MG/DL (ref 0.76–1.27)
DEPRECATED RDW RBC AUTO: 43.8 FL (ref 37–54)
EGFRCR SERPLBLD CKD-EPI 2021: 82.1 ML/MIN/1.73
EOSINOPHIL # BLD AUTO: 0 10*3/MM3 (ref 0–0.4)
EOSINOPHIL NFR BLD AUTO: 0 % (ref 0.3–6.2)
ERYTHROCYTE [DISTWIDTH] IN BLOOD BY AUTOMATED COUNT: 12.4 % (ref 12.3–15.4)
GLUCOSE SERPL-MCNC: 122 MG/DL (ref 65–99)
HCT VFR BLD AUTO: 39.9 % (ref 37.5–51)
HGB BLD-MCNC: 13.5 G/DL (ref 13–17.7)
IMM GRANULOCYTES # BLD AUTO: 0.08 10*3/MM3 (ref 0–0.05)
IMM GRANULOCYTES NFR BLD AUTO: 0.5 % (ref 0–0.5)
LYMPHOCYTES # BLD AUTO: 1.55 10*3/MM3 (ref 0.7–3.1)
LYMPHOCYTES NFR BLD AUTO: 10.2 % (ref 19.6–45.3)
MCH RBC QN AUTO: 32.3 PG (ref 26.6–33)
MCHC RBC AUTO-ENTMCNC: 33.8 G/DL (ref 31.5–35.7)
MCV RBC AUTO: 95.5 FL (ref 79–97)
MONOCYTES # BLD AUTO: 1.01 10*3/MM3 (ref 0.1–0.9)
MONOCYTES NFR BLD AUTO: 6.7 % (ref 5–12)
NEUTROPHILS NFR BLD AUTO: 12.49 10*3/MM3 (ref 1.7–7)
NEUTROPHILS NFR BLD AUTO: 82.5 % (ref 42.7–76)
NRBC BLD AUTO-RTO: 0 /100 WBC (ref 0–0.2)
PLATELET # BLD AUTO: 253 10*3/MM3 (ref 140–450)
PMV BLD AUTO: 9.7 FL (ref 6–12)
POTASSIUM SERPL-SCNC: 4.4 MMOL/L (ref 3.5–5.2)
RBC # BLD AUTO: 4.18 10*6/MM3 (ref 4.14–5.8)
SODIUM SERPL-SCNC: 136 MMOL/L (ref 136–145)
WBC NRBC COR # BLD AUTO: 15.15 10*3/MM3 (ref 3.4–10.8)

## 2024-08-18 PROCEDURE — 99239 HOSP IP/OBS DSCHRG MGMT >30: CPT | Performed by: INTERNAL MEDICINE

## 2024-08-18 PROCEDURE — 80048 BASIC METABOLIC PNL TOTAL CA: CPT | Performed by: SURGERY

## 2024-08-18 PROCEDURE — 85025 COMPLETE CBC W/AUTO DIFF WBC: CPT | Performed by: SURGERY

## 2024-08-18 PROCEDURE — G0378 HOSPITAL OBSERVATION PER HR: HCPCS

## 2024-08-18 RX ORDER — OXYCODONE HYDROCHLORIDE AND ACETAMINOPHEN 5; 325 MG/1; MG/1
1 TABLET ORAL EVERY 6 HOURS PRN
Qty: 12 TABLET | Refills: 0 | Status: SHIPPED | OUTPATIENT
Start: 2024-08-18 | End: 2024-08-21

## 2024-08-18 RX ORDER — CALCIUM CARBONATE 500 MG/1
1 TABLET, CHEWABLE ORAL ONCE
Status: COMPLETED | OUTPATIENT
Start: 2024-08-18 | End: 2024-08-18

## 2024-08-18 RX ADMIN — ROSUVASTATIN CALCIUM 40 MG: 20 TABLET, COATED ORAL at 09:50

## 2024-08-18 RX ADMIN — ASPIRIN 81 MG: 81 TABLET, COATED ORAL at 09:50

## 2024-08-18 RX ADMIN — OXYCODONE HYDROCHLORIDE AND ACETAMINOPHEN 1 TABLET: 5; 325 TABLET ORAL at 04:58

## 2024-08-18 RX ADMIN — CALCIUM CARBONATE (ANTACID) CHEW TAB 500 MG 1 TABLET: 500 CHEW TAB at 05:20

## 2024-08-18 RX ADMIN — OXYCODONE HYDROCHLORIDE AND ACETAMINOPHEN 1 TABLET: 5; 325 TABLET ORAL at 09:55

## 2024-08-18 NOTE — DISCHARGE SUMMARY
[facility] Medicine Services  DISCHARGE SUMMARY    Patient Name: Chris Tyler  : 1960  MRN: [id number]    Date of Admission: 2024  8:40 PM  Date of Discharge:    Primary Care Physician: Provider, No Known    Consults       Date and Time Order Name Status Description    2024 12:19 AM Inpatient General Surgery Consult Completed             Hospital Course     Presenting Problem: acute appendicitis    Active Hospital Problems    Diagnosis  POA    **Acute appendicitis [K35.80]  Yes    Vocal cord dysfunction [J38.3]  Yes      Resolved Hospital Problems   No resolved problems to display.          Hospital Course:  Chris Tyler is a 64 y.o. male w benign pmhx who presented w RLQ pain, found to have acute appendicitis. To OR w Dr Mott on  w findings of simple appendicitis wo perforation or abscess    Post-op course c/w stridor evaluated by pulm in PACU w brief bronchoscopy w normal findings c/w vocal cord dysfunction. No recurrence upon return to floor. Advised on next steps if recurrent but does not appear more chronic/longstanding issue      Discharge Follow Up Recommendations for outpatient labs/diagnostics:   F/u Dr Mott 2 weeks    Day of Discharge     HPI:   Doing well today without any respiratory complaints now      Vital Signs:   Temp:  [98.1 °F (36.7 °C)-98.6 °F (37 °C)] 98.3 °F (36.8 °C)  Heart Rate:  [51-90] 64  Resp:  [18] 18  BP: (102-127)/(62-68) 127/62      Physical Exam:  Constitutional: No acute distress, awake, alert  HENT: NCAT, mucous membranes moist  Respiratory: Clear to auscultation bilaterally, respiratory effort normal   Cardiovascular: RRR, no murmurs, rubs, or gallops  Gastrointestinal: Soft, nondistended, mild tenderness onr ight side, lap sites w minimal bandage  Musculoskeletal: Muscle tone within normal limits, no joint effusions appreciated  Psychiatric: Appropriate affect, cooperative  Neurologic: Alert and oriented, facial movements  symmetric and spontaneous movement of all 4 extremities grossly equal bilaterally, speech clear  Skin: No rashes    Pertinent  and/or Most Recent Results     LAB RESULTS:      Lab 08/18/24 0423 08/17/24  0705 08/16/24 2006   WBC 15.15* 10.34 13.33*   HEMOGLOBIN 13.5 13.7 15.4   HEMATOCRIT 39.9 40.5 44.6   PLATELETS 253 238 245   NEUTROS ABS 12.49* 5.48 8.14*   IMMATURE GRANS (ABS) 0.08* 0.03 0.05   LYMPHS ABS 1.55 3.12* 3.43*   MONOS ABS 1.01* 1.26* 1.43*   EOS ABS 0.00 0.38 0.20   MCV 95.5 97.1* 95.5   LACTATE  --   --  1.1   PROTIME  --  13.1  --          Lab 08/18/24 0423 08/17/24  0705 08/16/24 2006   SODIUM 136 139 136   POTASSIUM 4.4 4.4 4.0   CHLORIDE 100 103 98   CO2 25.0 27.0 28.0   ANION GAP 11.0 9.0 10.0   BUN 16 15 16   CREATININE 1.02 1.16 1.10   EGFR 82.1 70.3 75.0   GLUCOSE 122* 91 127*   CALCIUM 9.1 8.8 9.8   MAGNESIUM  --  2.2  --          Lab 08/17/24 0705 08/16/24 2006   TOTAL PROTEIN 5.8* 7.5   ALBUMIN 3.8 4.1   GLOBULIN 2.0 3.4   ALT (SGPT) 11 15   AST (SGOT) 13 14   BILIRUBIN 0.2 0.5   ALK PHOS 65 82   LIPASE  --  36         Lab 08/17/24 0705   PROTIME 13.1   INR 0.98                 Brief Urine Lab Results  (Last result in the past 365 days)        Color   Clarity   Blood   Leuk Est   Nitrite   Protein   CREAT   Urine HCG        08/16/24 2008 Dark Yellow   Clear   Negative   Trace   Negative   Trace                 Microbiology Results (last 10 days)       Procedure Component Value - Date/Time    Blood Culture - Blood, Arm, Right [[id number]]  (Normal) Collected: 08/16/24 2211    Lab Status: Preliminary result Specimen: Blood from Arm, Right Updated: 08/18/24 0730     Blood Culture No growth at 24 hours    Blood Culture - Blood, Arm, Right [[id number]]  (Normal) Collected: 08/16/24 2209    Lab Status: Preliminary result Specimen: Blood from Arm, Right Updated: 08/18/24 0730     Blood Culture No growth at 24 hours            CT Abdomen Pelvis With Contrast    Result Date: 8/16/2024  CT  ABDOMEN PELVIS W CONTRAST Date of Exam: 8/16/2024 8:30 PM EDT Indication: rlq pain  r/o appendicitis. Comparison: None available. Technique: Axial CT images were obtained of the abdomen and pelvis following the uneventful intravenous administration of iodinated contrast. Reconstructed coronal and sagittal images were also obtained. Automated exposure control and iterative construction methods were used. Findings: The lung bases are clear bilaterally. A gallstone is noted in the gallbladder without CT evidence of cholecystitis. The liver, spleen, pancreas and adrenal glands appear unremarkable. Both kidneys appear normal. Moderate atherosclerotic changes are noted in the abdominal aorta and its major branches. No adenopathy or free fluid is seen in the abdomen or pelvis. The urinary bladder, seminal vesicles and prostate appear within normal limits for age. The appendix is thickened measuring 1.2 cm in diameter. Surrounding infiltrative changes are noted consistent with acute appendicitis. Secondary inflammatory changes of the terminal ileum are suspected. No free air or abscess is identified. The bowel otherwise appears unremarkable. There is a 0.2 cm anterolisthesis of L4 on L5. No focal osseous lesion is seen.     Impression: 1.Acute appendicitis without evidence of perforation or abscess. 2.Suspected secondary inflammation of the adjacent terminal ileum. 3.Cholelithiasis. Electronically Signed: Shine Lua MD  8/16/2024 9:35 PM EDT  Workstation ID: [id number]                 Plan for Follow-up of Pending Labs/Results:   Pending Labs       Order Current Status    Tissue Pathology Exam Collected (08/17/24 1050)    Blood Culture - Blood, Arm, Right Preliminary result    Blood Culture - Blood, Arm, Right Preliminary result          Discharge Details        Discharge Medications        New Medications        Instructions Start Date   oxyCODONE-acetaminophen 5-325 MG per tablet  Commonly known as: PERCOCET   1 tablet,  Oral, Every 6 Hours PRN             Continue These Medications        Instructions Start Date   aspirin 81 MG EC tablet   81 mg, Oral, Daily      ezetimibe 10 MG tablet  Commonly known as: ZETIA   10 mg, Oral, Daily      lisinopril 20 MG tablet  Commonly known as: PRINIVIL,ZESTRIL   20 mg, Oral, Daily      rosuvastatin 20 MG tablet  Commonly known as: CRESTOR   20 mg, Oral, Daily      spironolactone 25 MG tablet  Commonly known as: ALDACTONE   25 mg, Oral, Daily               No Known Allergies      Discharge Disposition:  Home or Self Care    Diet:  Hospital:No active diet order           Activity:      Restrictions or Other Recommendations:         CODE STATUS:    Code Status and Medical Interventions: CPR (Attempt to Resuscitate); Full   Ordered at: 08/17/24 0635     Level Of Support Discussed With:    Patient     Code Status (Patient has no pulse and is not breathing):    CPR (Attempt to Resuscitate)     Medical Interventions (Patient has pulse or is breathing):    Full       No future appointments.    Additional Instructions for the Follow-ups that You Need to Schedule       Discharge Follow-up with PCP   As directed       Currently Documented PCP:    Provider, No Known    PCP Phone Number:    None     Follow Up Details: 1 week        Discharge Follow-up with Specified Provider: Dr Mott; 2 Weeks   As directed      To: Dr Mott   Follow Up: 2 Weeks                      Cintia Cerda MD  08/18/24      Time Spent on Discharge:  I spent  35  minutes on this discharge activity which included: face-to-face encounter with the patient, reviewing the data in the system, coordination of the care with the nursing staff as well as consultants, documentation, and entering orders.    D/w surgery

## 2024-08-18 NOTE — PLAN OF CARE
Goal Outcome Evaluation:      VSS overnight with intermittent complaints of pain from lap sites. Pain adequately managed with PRN analgesics. Lap sites remained clean, dry, and intact throughout shift. Patient remained on RA and A&O X4. Patient ambulated with standby assistance to the bathroom multiple times throughout shift. Instruction provided and proper use of incentive spirometer performed. Expected discharge today. Call light left continuously with patient. No further concerns at this time, will continue to monitor.     Problem: Adult Inpatient Plan of Care  Goal: Plan of Care Review  Outcome: Ongoing, Progressing  Goal: Patient-Specific Goal (Individualized)  Outcome: Ongoing, Progressing  Goal: Absence of Hospital-Acquired Illness or Injury  Outcome: Ongoing, Progressing  Intervention: Identify and Manage Fall Risk  Recent Flowsheet Documentation  Taken 8/18/2024 0400 by Theresa Ray RN  Safety Promotion/Fall Prevention:   activity supervised   assistive device/personal items within reach   clutter free environment maintained   fall prevention program maintained   lighting adjusted   mobility aid in reach   nonskid shoes/slippers when out of bed   muscle strengthening facilitated   room organization consistent   safety round/check completed   toileting scheduled  Taken 8/18/2024 0200 by Theresa Ray RN  Safety Promotion/Fall Prevention:   activity supervised   assistive device/personal items within reach   clutter free environment maintained   fall prevention program maintained   lighting adjusted   mobility aid in reach   nonskid shoes/slippers when out of bed   muscle strengthening facilitated   room organization consistent   safety round/check completed   toileting scheduled  Taken 8/18/2024 0000 by Theresa Ray RN  Safety Promotion/Fall Prevention:   activity supervised   assistive device/personal items within reach   clutter free environment maintained   fall prevention program  maintained   lighting adjusted   mobility aid in reach   nonskid shoes/slippers when out of bed   muscle strengthening facilitated   room organization consistent   safety round/check completed   toileting scheduled  Taken 8/17/2024 2200 by Theresa Ray RN  Safety Promotion/Fall Prevention:   activity supervised   assistive device/personal items within reach   clutter free environment maintained   fall prevention program maintained   lighting adjusted   mobility aid in reach   nonskid shoes/slippers when out of bed   muscle strengthening facilitated   room organization consistent   safety round/check completed   toileting scheduled  Taken 8/17/2024 2000 by Theresa Ray RN  Safety Promotion/Fall Prevention:   activity supervised   assistive device/personal items within reach   clutter free environment maintained   fall prevention program maintained   lighting adjusted   mobility aid in reach   nonskid shoes/slippers when out of bed   muscle strengthening facilitated   room organization consistent   safety round/check completed   toileting scheduled  Intervention: Prevent Skin Injury  Recent Flowsheet Documentation  Taken 8/18/2024 0400 by Theresa Ray RN  Body Position:   upper extremity elevated   lower extremity elevated   position changed independently  Taken 8/18/2024 0200 by Theresa Ray RN  Body Position:   upper extremity elevated   lower extremity elevated   position changed independently  Taken 8/18/2024 0000 by Theresa Ray RN  Body Position:   upper extremity elevated   lower extremity elevated   position changed independently  Taken 8/17/2024 2200 by Theresa Ray RN  Body Position:   upper extremity elevated   lower extremity elevated   position changed independently  Taken 8/17/2024 2000 by Theresa Ray RN  Body Position:   upper extremity elevated   lower extremity elevated   position changed independently  Skin Protection:   adhesive use limited   tubing/devices free  from skin contact   transparent dressing maintained  Intervention: Prevent and Manage VTE (Venous Thromboembolism) Risk  Recent Flowsheet Documentation  Taken 8/18/2024 0400 by Theresa Ray RN  Activity Management: activity encouraged  Range of Motion:   active ROM (range of motion) encouraged   ROM (range of motion) performed  Taken 8/18/2024 0200 by Theresa Ray RN  Activity Management: activity encouraged  Taken 8/18/2024 0000 by Theresa Ray RN  Activity Management: activity encouraged  Range of Motion:   active ROM (range of motion) encouraged   ROM (range of motion) performed  Taken 8/17/2024 2200 by Theresa Ray RN  Activity Management: activity encouraged  Taken 8/17/2024 2000 by Theresa Ray RN  Activity Management: activity encouraged  VTE Prevention/Management: (patient regularly ambulating)   bilateral   sequential compression devices off  Range of Motion:   active ROM (range of motion) encouraged   ROM (range of motion) performed  Intervention: Prevent Infection  Recent Flowsheet Documentation  Taken 8/18/2024 0400 by Theresa Ray RN  Infection Prevention:   environmental surveillance performed   hand hygiene promoted   rest/sleep promoted   single patient room provided  Taken 8/18/2024 0200 by Theresa Ray RN  Infection Prevention:   environmental surveillance performed   hand hygiene promoted   rest/sleep promoted   single patient room provided  Taken 8/18/2024 0000 by Theresa Ray RN  Infection Prevention:   environmental surveillance performed   hand hygiene promoted   rest/sleep promoted  Taken 8/17/2024 2000 by Theresa Ray RN  Infection Prevention:   environmental surveillance performed   hand hygiene promoted   rest/sleep promoted   single patient room provided  Goal: Optimal Comfort and Wellbeing  Outcome: Ongoing, Progressing  Intervention: Monitor Pain and Promote Comfort  Recent Flowsheet Documentation  Taken 8/17/2024 2054 by Theresa Ray  RN  Pain Management Interventions:   biofeedback utilized   breathing exercises   care clustered   pain management plan reviewed with patient/caregiver   pillow support provided   position adjusted   relaxation techniques promoted   see MAR   quiet environment facilitated   unnecessary movement minimized   therapeutic touch utilized  Intervention: Provide Person-Centered Care  Recent Flowsheet Documentation  Taken 8/18/2024 0400 by Theresa Ray RN  Trust Relationship/Rapport:   care explained   choices provided   emotional support provided   empathic listening provided   questions answered   questions encouraged   reassurance provided   thoughts/feelings acknowledged  Taken 8/18/2024 0000 by Theresa Ray RN  Trust Relationship/Rapport:   care explained   choices provided   emotional support provided   questions answered   questions encouraged   reassurance provided   thoughts/feelings acknowledged   empathic listening provided  Taken 8/17/2024 2000 by Theresa Ray RN  Trust Relationship/Rapport:   care explained   choices provided   emotional support provided   empathic listening provided   questions answered   questions encouraged   reassurance provided   thoughts/feelings acknowledged  Goal: Readiness for Transition of Care  Outcome: Ongoing, Progressing     Problem: Adjustment to Illness (Sepsis/Septic Shock)  Goal: Optimal Coping  Outcome: Ongoing, Progressing  Intervention: Optimize Psychosocial Adjustment to Illness  Recent Flowsheet Documentation  Taken 8/17/2024 2000 by Theresa Ray RN  Supportive Measures:   active listening utilized   relaxation techniques promoted   self-care encouraged   verbalization of feelings encouraged  Family/Support System Care:   caregiver stress acknowledged   self-care encouraged   support provided     Problem: Bleeding (Sepsis/Septic Shock)  Goal: Absence of Bleeding  Outcome: Ongoing, Progressing  Intervention: Monitor and Manage Bleeding  Recent Flowsheet  Documentation  Taken 8/17/2024 2000 by Theresa Ray RN  Bleeding Precautions: blood pressure closely monitored  Bleeding Management: dressing monitored     Problem: Glycemic Control Impaired (Sepsis/Septic Shock)  Goal: Blood Glucose Level Within Desired Range  Outcome: Ongoing, Progressing     Problem: Infection Progression (Sepsis/Septic Shock)  Goal: Absence of Infection Signs and Symptoms  Outcome: Ongoing, Progressing  Intervention: Initiate Sepsis Management  Recent Flowsheet Documentation  Taken 8/18/2024 0400 by Theresa Ray RN  Infection Prevention:   environmental surveillance performed   hand hygiene promoted   rest/sleep promoted   single patient room provided  Taken 8/18/2024 0200 by Theresa Ray RN  Infection Prevention:   environmental surveillance performed   hand hygiene promoted   rest/sleep promoted   single patient room provided  Taken 8/18/2024 0000 by Theresa Ray RN  Infection Prevention:   environmental surveillance performed   hand hygiene promoted   rest/sleep promoted  Taken 8/17/2024 2000 by Theresa Ray RN  Stabilization Measures: legs elevated  Infection Management: aseptic technique maintained  Infection Prevention:   environmental surveillance performed   hand hygiene promoted   rest/sleep promoted   single patient room provided  Intervention: Promote Recovery  Recent Flowsheet Documentation  Taken 8/18/2024 0400 by Theresa Ray RN  Activity Management: activity encouraged  Taken 8/18/2024 0200 by Theresa Ray RN  Activity Management: activity encouraged  Taken 8/18/2024 0000 by Theresa Ray RN  Activity Management: activity encouraged  Taken 8/17/2024 2200 by Theresa Ray RN  Activity Management: activity encouraged  Taken 8/17/2024 2000 by Theresa Ray RN  Activity Management: activity encouraged  Airway/Ventilation Support: pulmonary hygiene promoted  Sleep/Rest Enhancement:   awakenings minimized   consistent schedule promoted    noise level reduced   relaxation techniques promoted   therapeutic touch utilized   regular sleep/rest pattern promoted     Problem: Nutrition Impaired (Sepsis/Septic Shock)  Goal: Optimal Nutrition Intake  Outcome: Ongoing, Progressing

## 2024-08-18 NOTE — PROGRESS NOTES
"Patient Name:  Chris Tyler  YOB: 1960  [id number]    Surgery Progress Note    Date of visit: 8/18/2024    Subjective   Pain well-controlled.  No nausea or vomiting.  No fevers or chills.  No shortness of breath overnight.         Objective       /68 (BP Location: Left arm, Patient Position: Lying)   Pulse 58   Temp 98.1 °F (36.7 °C) (Oral)   Resp 18   Ht 172.7 cm (68\")   Wt 80.7 kg (178 lb)   SpO2 96%   BMI 27.06 kg/m²     Intake/Output Summary (Last 24 hours) at 8/18/2024 1023  Last data filed at 8/18/2024 0450  Gross per 24 hour   Intake 1040 ml   Output 800 ml   Net 240 ml       CV:  Rhythm regular and rate regular  L:  Clear to auscultation bilaterally  Abd:  Bowel sounds positive, soft, appropriately tender, incisions clean dry and intact  Ext:  No cyanosis, clubbing, edema    Recent labs and imaging that are back at this time have been reviewed.   WBC 15.1  Hemoglobin 13.5  Creatinine 1.02       Assessment & Plan     Patient postop day 1 from laparoscopic appendectomy.  Vocal cord irritation seems to have improved significantly.  Pain control.  Out of bed, incentive spirometer, DVT prophylaxis.  Okay for DC planning from surgical perspective.  Dressings can come off 8/19/2024.  No antibiotics needed at discharge.  Should follow with me in 2 weeks.        Alex Mott MD  8/18/2024  10:23 EDT      "

## 2024-08-20 LAB
CYTO UR: NORMAL
LAB AP CASE REPORT: NORMAL
LAB AP CLINICAL INFORMATION: NORMAL
PATH REPORT.FINAL DX SPEC: NORMAL
PATH REPORT.GROSS SPEC: NORMAL

## 2024-08-22 LAB
BACTERIA SPEC AEROBE CULT: NORMAL
BACTERIA SPEC AEROBE CULT: NORMAL

## 2025-06-17 ENCOUNTER — PATIENT ROUNDING (BHMG ONLY) (OUTPATIENT)
Age: 65
End: 2025-06-17

## 2025-06-17 ENCOUNTER — OFFICE VISIT (OUTPATIENT)
Age: 65
End: 2025-06-17
Payer: MEDICARE

## 2025-06-17 VITALS
HEIGHT: 68 IN | HEART RATE: 50 BPM | DIASTOLIC BLOOD PRESSURE: 60 MMHG | BODY MASS INDEX: 28.2 KG/M2 | WEIGHT: 186.1 LBS | SYSTOLIC BLOOD PRESSURE: 105 MMHG

## 2025-06-17 DIAGNOSIS — I07.1 MILD TRICUSPID REGURGITATION: ICD-10-CM

## 2025-06-17 DIAGNOSIS — E78.5 HYPERLIPIDEMIA LDL GOAL <55: ICD-10-CM

## 2025-06-17 DIAGNOSIS — I10 PRIMARY HYPERTENSION: ICD-10-CM

## 2025-06-17 DIAGNOSIS — I25.810 CORONARY ARTERY DISEASE INVOLVING CORONARY BYPASS GRAFT OF NATIVE HEART, UNSPECIFIED WHETHER ANGINA PRESENT: Primary | ICD-10-CM

## 2025-06-17 PROCEDURE — 99214 OFFICE O/P EST MOD 30 MIN: CPT

## 2025-06-17 PROCEDURE — 1159F MED LIST DOCD IN RCRD: CPT

## 2025-06-17 PROCEDURE — 1160F RVW MEDS BY RX/DR IN RCRD: CPT

## 2025-06-17 PROCEDURE — G2211 COMPLEX E/M VISIT ADD ON: HCPCS

## 2025-06-17 RX ORDER — ROSUVASTATIN CALCIUM 40 MG/1
40 TABLET, COATED ORAL DAILY
COMMUNITY

## 2025-06-17 RX ORDER — LISINOPRIL 40 MG/1
40 TABLET ORAL DAILY
COMMUNITY

## 2025-06-17 NOTE — PROGRESS NOTES
Cardiology Established Patient Note     Name: Chris Tyler  :   1960  PCP: Provider, No Known  Date:   2025  [place] [facility] [place] [facility]  [address]  [facility] [address]  Fax [phone]  Phone [phone]    Chief Complaint:   Chief Complaint   Patient presents with    Coronary Artery Disease    Hyperlipidemia    Hypertension      Problem list:  1.  Coronary artery disease status post CABG    [facility] 1997 EF 45% multivessel CAD insertion of IABP, reperfused infarct vessel with LAEVRNE grade III flow following thrombolysis.   Suspect 2022 SSS 11 SDS 5 fixed small mild apical, mixed moderate moderate lateral.  EF 51%  2.  Hypertension benign essential   2D echo 10/22/2024 EF 60%, mild TR   Renal artery duplex 2017 negative for renal artery stenosis  3.  Hyperlipidemia  4.  Mild TR      Subjective     History of Present Illness  Chris Tyler is a 65 y.o. male who presents today for routine 6-month follow-up.  Patient reports he is doing well overall.  Denies any new symptoms, denies chest pain, shortness of breath or palpitations.  Patient does report he has a history of shortness of breath but has lost weight per Dr. Moreira documentation and has seen some improvement.  Patient reports he is currently walking 30 minutes on his treadmill daily, was walking outside but the cicadas have pushed him inside for now.  Patient is seeing Dr. Julian currently but is aware that he is retiring.  No recent labs in the system, patient reports he just had labs with Dr. Julian will obtain those.      Current Outpatient Medications   Medication Instructions    aspirin 81 mg, Daily    Cholecalciferol (Vitamin D-3) 125 MCG (5000 UT) tablet 1 tablet, Daily    ezetimibe (ZETIA) 10 mg, Daily    lisinopril (PRINIVIL,ZESTRIL) 40 mg, Daily    rosuvastatin (CRESTOR) 40 mg, Daily    spironolactone (ALDACTONE) 25 mg, Daily        Lab Results  "  Component Value Date    GLUCOSE 122 (H) 08/18/2024    BUN 16 08/18/2024    CREATININE 1.02 08/18/2024    BCR 15.7 08/18/2024    K 4.4 08/18/2024    CO2 25.0 08/18/2024    CALCIUM 9.1 08/18/2024    ALBUMIN 3.8 08/17/2024    AST 13 08/17/2024    ALT 11 08/17/2024     Lab Results   Component Value Date    WBC 15.15 (H) 08/18/2024    RBC 4.18 08/18/2024    HGB 13.5 08/18/2024    HCT 39.9 08/18/2024    MCV 95.5 08/18/2024     08/18/2024     No results found for: \"TSH\"  No results found for: \"HGBA1C\"  No results found for: \"CHOL\", \"CHLPL\", \"TRIG\", \"HDL\", \"LDL\", \"LDLDIRECT\"   No results found for: \"LIPOA\"   No results found for: \"MICROALBUR\"  No results found for: \"MALBCRERATIO\"  eGFR   Date Value Ref Range Status   08/18/2024 82.1 >60.0 mL/min/1.73 Final       Objective     Vital Signs:  /60 (BP Location: Right arm, Patient Position: Sitting)   Pulse 50   Ht 172.7 cm (68\")   Wt 84.4 kg (186 lb 1.6 oz)   BMI 28.30 kg/m²   Estimated body mass index is 28.3 kg/m² as calculated from the following:    Height as of this encounter: 172.7 cm (68\").    Weight as of this encounter: 84.4 kg (186 lb 1.6 oz).       Constitutional:       Appearance: Healthy appearance.   Eyes:      Pupils: Pupils are equal, round, and reactive to light.   HENT:      Nose: Nose normal.   Pulmonary:      Effort: Pulmonary effort is normal.      Breath sounds: Normal breath sounds.   Cardiovascular:      Normal rate. Regular rhythm.      Murmurs: There is no murmur.   Pulses:     Intact distal pulses.   Edema:     Peripheral edema absent.   Abdominal:      General: Bowel sounds are normal.   Musculoskeletal: Normal range of motion.      Cervical back: Normal range of motion. Skin:     General: Skin is warm and dry.   Neurological:      General: No focal deficit present.      Mental Status: Alert and oriented to person, place and time.      Gait: Gait is intact.               Assessment and Plan     Assessment & Plan  Coronary artery " disease involving coronary bypass graft of native heart, unspecified whether angina present    CCS=0  Continue aspirin 81 mg p.o. daily  Continue Zetia 10 mg p.o. daily  Continue lisinopril 40 mg p.o. daily  Continue rosuvastatin 40 mg p.o. daily       Primary hypertension  Continue lisinopril 40 mg p.o. daily  Continue spironolactone 25 mg p.o. daily, discussed with patient he needs every 3 month CMP    Orders:    CBC (No Diff); Future    Comprehensive Metabolic Panel; Future    Comprehensive Metabolic Panel; Future    Hyperlipidemia LDL goal <55  Obtain labs from Dr. Julian's office  Continue Zetia 10 mg p.o. daily  Continue rosuvastatin 40 mg p.o. daily    Orders:    Lipid Panel; Future    Mild tricuspid regurgitation  2D echo later or with new symptom         Follow Up  Return in about 6 months (around 12/17/2025).    [facility]

## 2025-06-18 NOTE — PROGRESS NOTES
My name is Miri Rios, and I am the Practice Manager for [facility] [place].    I would like to thank you for being a loyal patient. If you do not mind, I would like to ask you some questions about your recent visit with us. Please feel free to reply if you wish to provide us with feedback on your visit with our practice.    First, could you tell me what went well with your recent visit?    Secondly, we are always looking for ways to make our patients' experiences even better. Do you have any recommendations on what we can do to improve your experience?    Finally, overall were you satisfied with your visit with us as a [facility] facility?    Over the next few days, you will be receiving a Patient Experience Survey. Please consider taking the survey, as it helps [facility] in improving their patient care.    Thank you for taking the time to answer our questions today.    I hope you have a good day.

## (undated) DEVICE — ENDOPATH XCEL BLADELESS TROCARS WITH STABILITY SLEEVES: Brand: ENDOPATH XCEL

## (undated) DEVICE — ENDOPATH XCEL UNIVERSAL TROCAR STABLILITY SLEEVES: Brand: ENDOPATH XCEL

## (undated) DEVICE — LAPAROVUE VISIBILITY SYSTEM LAPAROSCOPIC SOLUTIONS: Brand: LAPAROVUE

## (undated) DEVICE — PATIENT RETURN ELECTRODE, SINGLE-USE, CONTACT QUALITY MONITORING, ADULT, WITH 9FT CORD, FOR PATIENTS WEIGING OVER 33LBS. (15KG): Brand: MEGADYNE

## (undated) DEVICE — APPL CHLORAPREP TINTED 26ML TEAL

## (undated) DEVICE — LAPAROSCOPIC SMOKE FILTRATION SYSTEM: Brand: PALL LAPAROSHIELD® PLUS LAPAROSCOPIC SMOKE FILTRATION SYSTEM

## (undated) DEVICE — ECHELON 3000 45 STANDARD: Brand: ECHELON

## (undated) DEVICE — MINI ENDOCUT SCISSOR TIP, DISPOSABLE: Brand: RENEW

## (undated) DEVICE — SUT SILK 3/0 SH 30IN K832H

## (undated) DEVICE — ENSEAL X1 TISSUE SEALER, CURVED JAW, 37 CM SHAFT LENGTH: Brand: ENSEAL

## (undated) DEVICE — SUT VIC 0 UR6 27IN VCP603H

## (undated) DEVICE — UNDERGLV SURG BIOGEL INDICATOR LF PF 7.5

## (undated) DEVICE — PK LAP LASR CHOLE 10

## (undated) DEVICE — GLV SURG BIOGEL LTX PF 7

## (undated) DEVICE — BLANKT WARM UPPR/BDY ARM/OUT 57X196CM

## (undated) DEVICE — ENDOPATH XCEL BLUNT TIP TROCARS WITH SMOOTH SLEEVES: Brand: ENDOPATH XCEL

## (undated) DEVICE — SUT MNCRYL PLS ANTIB UD 4/0 PS2 18IN

## (undated) DEVICE — ENDOPOUCH RETRIEVER SPECIMEN RETRIEVAL BAGS: Brand: ENDOPOUCH RETRIEVER

## (undated) DEVICE — [HIGH FLOW INSUFFLATOR,  DO NOT USE IF PACKAGE IS DAMAGED,  KEEP DRY,  KEEP AWAY FROM SUNLIGHT,  PROTECT FROM HEAT AND RADIOACTIVE SOURCES.]: Brand: PNEUMOSURE